# Patient Record
Sex: MALE | Race: WHITE | URBAN - METROPOLITAN AREA
[De-identification: names, ages, dates, MRNs, and addresses within clinical notes are randomized per-mention and may not be internally consistent; named-entity substitution may affect disease eponyms.]

---

## 2019-01-05 ENCOUNTER — OFFICE VISIT (OUTPATIENT)
Dept: URGENT CARE | Facility: URGENT CARE | Age: 59
End: 2019-01-05
Payer: COMMERCIAL

## 2019-01-05 VITALS
SYSTOLIC BLOOD PRESSURE: 146 MMHG | OXYGEN SATURATION: 99 % | WEIGHT: 201 LBS | HEART RATE: 91 BPM | DIASTOLIC BLOOD PRESSURE: 90 MMHG | TEMPERATURE: 98.2 F

## 2019-01-05 DIAGNOSIS — J02.9 SORE THROAT: Primary | ICD-10-CM

## 2019-01-05 DIAGNOSIS — M10.00 IDIOPATHIC GOUT, UNSPECIFIED CHRONICITY, UNSPECIFIED SITE: ICD-10-CM

## 2019-01-05 LAB
DEPRECATED S PYO AG THROAT QL EIA: NORMAL
SPECIMEN SOURCE: NORMAL

## 2019-01-05 PROCEDURE — 99203 OFFICE O/P NEW LOW 30 MIN: CPT | Performed by: FAMILY MEDICINE

## 2019-01-05 PROCEDURE — 87081 CULTURE SCREEN ONLY: CPT | Performed by: INTERNAL MEDICINE

## 2019-01-05 PROCEDURE — 87880 STREP A ASSAY W/OPTIC: CPT | Performed by: INTERNAL MEDICINE

## 2019-01-05 RX ORDER — INDOMETHACIN 50 MG/1
50 CAPSULE ORAL
Qty: 30 CAPSULE | Refills: 1 | Status: SHIPPED | OUTPATIENT
Start: 2019-01-05 | End: 2019-01-08

## 2019-01-05 RX ORDER — IBUPROFEN 200 MG
200 TABLET ORAL EVERY 4 HOURS PRN
COMMUNITY

## 2019-01-05 NOTE — PROGRESS NOTES
Subjective: Patient has a history of joint problems for many years, has never seen a doctor about it, has not seen a doctor in over 20 years, but he reports joints and spots on his body that swell up and are painful for a while and then go away.  He developed some right foot swelling about 4 days ago and it is painful, got a respiratory infection about a week ago and normally he says he never gets colds, sore throat with it, slowly getting better.  Then last night his left elbow area just distal to the joint became extremely swollen and is already going down quite a bit.  In some ways these are familiar symptoms to him.    Objective: ENT is normal.  Neck is normal.  Lungs are clear.  Heart is regular without murmurs.  Abdomen benign.  She has a slightly tender slightly red lateral foot on the right, slightly warm.  She has a swollen spot on his left elbow distal to the joint, about 3 cm in diameter, not particularly tender.    Assessment and plan: The respiratory infection is just a cold but I wonder if gout is the underlying problem with his unusual sets of symptoms.  His father had gout.  I suggested he follow-up with her regular doctor, needs to find one, and do a trial of indomethacin 50 mg 3 times a day for couple of days until things calm down.  This could theoretically be used in the future as well.

## 2019-01-06 LAB
BACTERIA SPEC CULT: NORMAL
SPECIMEN SOURCE: NORMAL

## 2024-12-21 ENCOUNTER — APPOINTMENT (OUTPATIENT)
Dept: MRI IMAGING | Facility: CLINIC | Age: 64
End: 2024-12-21
Attending: EMERGENCY MEDICINE
Payer: COMMERCIAL

## 2024-12-21 ENCOUNTER — HOSPITAL ENCOUNTER (OUTPATIENT)
Facility: CLINIC | Age: 64
Setting detail: OBSERVATION
Discharge: HOME | End: 2024-12-23
Attending: EMERGENCY MEDICINE | Admitting: EMERGENCY MEDICINE
Payer: COMMERCIAL

## 2024-12-21 DIAGNOSIS — I65.02 VERTEBRAL ARTERY OCCLUSION, LEFT: Primary | ICD-10-CM

## 2024-12-21 DIAGNOSIS — I10 HYPERTENSION, UNSPECIFIED TYPE: ICD-10-CM

## 2024-12-21 DIAGNOSIS — R20.2 FACIAL PARESTHESIA: ICD-10-CM

## 2024-12-21 DIAGNOSIS — E11.9 DIABETES MELLITUS, NEW ONSET (H): ICD-10-CM

## 2024-12-21 LAB
ANION GAP SERPL CALCULATED.3IONS-SCNC: 14 MMOL/L (ref 7–15)
ATRIAL RATE - MUSE: 91 BPM
B-OH-BUTYR SERPL-SCNC: 0.21 MMOL/L
BASE EXCESS BLDV CALC-SCNC: 1 MMOL/L (ref -3–3)
BASOPHILS # BLD AUTO: 0 10E3/UL (ref 0–0.2)
BASOPHILS NFR BLD AUTO: 0 %
BUN SERPL-MCNC: 19.8 MG/DL (ref 8–23)
CALCIUM SERPL-MCNC: 9 MG/DL (ref 8.8–10.4)
CHLORIDE SERPL-SCNC: 91 MMOL/L (ref 98–107)
CREAT SERPL-MCNC: 1.01 MG/DL (ref 0.67–1.17)
DIASTOLIC BLOOD PRESSURE - MUSE: NORMAL MMHG
EGFRCR SERPLBLD CKD-EPI 2021: 83 ML/MIN/1.73M2
EOSINOPHIL # BLD AUTO: 0.1 10E3/UL (ref 0–0.7)
EOSINOPHIL NFR BLD AUTO: 1 %
ERYTHROCYTE [DISTWIDTH] IN BLOOD BY AUTOMATED COUNT: 11.9 % (ref 10–15)
EST. AVERAGE GLUCOSE BLD GHB EST-MCNC: 280 MG/DL
FLUAV RNA SPEC QL NAA+PROBE: NEGATIVE
FLUBV RNA RESP QL NAA+PROBE: NEGATIVE
GLUCOSE BLDC GLUCOMTR-MCNC: 292 MG/DL (ref 70–99)
GLUCOSE SERPL-MCNC: 472 MG/DL (ref 70–99)
HBA1C MFR BLD: 11.4 %
HCO3 BLDV-SCNC: 26 MMOL/L (ref 21–28)
HCO3 SERPL-SCNC: 26 MMOL/L (ref 22–29)
HCT VFR BLD AUTO: 46.5 % (ref 40–53)
HGB BLD-MCNC: 16.8 G/DL (ref 13.3–17.7)
HOLD SPECIMEN: NORMAL
IMM GRANULOCYTES # BLD: 0 10E3/UL
IMM GRANULOCYTES NFR BLD: 0 %
INTERPRETATION ECG - MUSE: NORMAL
LYMPHOCYTES # BLD AUTO: 3.2 10E3/UL (ref 0.8–5.3)
LYMPHOCYTES NFR BLD AUTO: 39 %
MCH RBC QN AUTO: 31.2 PG (ref 26.5–33)
MCHC RBC AUTO-ENTMCNC: 36.1 G/DL (ref 31.5–36.5)
MCV RBC AUTO: 86 FL (ref 78–100)
MONOCYTES # BLD AUTO: 0.7 10E3/UL (ref 0–1.3)
MONOCYTES NFR BLD AUTO: 8 %
NEUTROPHILS # BLD AUTO: 4.3 10E3/UL (ref 1.6–8.3)
NEUTROPHILS NFR BLD AUTO: 51 %
NRBC # BLD AUTO: 0 10E3/UL
NRBC BLD AUTO-RTO: 0 /100
O2/TOTAL GAS SETTING VFR VENT: 0 %
OXYHGB MFR BLDV: 80 % (ref 70–75)
P AXIS - MUSE: 74 DEGREES
PCO2 BLDV: 43 MM HG (ref 40–50)
PH BLDV: 7.39 [PH] (ref 7.32–7.43)
PLATELET # BLD AUTO: 256 10E3/UL (ref 150–450)
PO2 BLDV: 44 MM HG (ref 25–47)
POTASSIUM SERPL-SCNC: 4.7 MMOL/L (ref 3.4–5.3)
PR INTERVAL - MUSE: 130 MS
QRS DURATION - MUSE: 92 MS
QT - MUSE: 350 MS
QTC - MUSE: 430 MS
R AXIS - MUSE: 50 DEGREES
RBC # BLD AUTO: 5.39 10E6/UL (ref 4.4–5.9)
RSV RNA SPEC NAA+PROBE: NEGATIVE
S PYO DNA THROAT QL NAA+PROBE: NOT DETECTED
SAO2 % BLDV: 81.4 % (ref 70–75)
SARS-COV-2 RNA RESP QL NAA+PROBE: NEGATIVE
SODIUM SERPL-SCNC: 131 MMOL/L (ref 135–145)
SYSTOLIC BLOOD PRESSURE - MUSE: NORMAL MMHG
T AXIS - MUSE: 28 DEGREES
TROPONIN T SERPL HS-MCNC: 17 NG/L
VENTRICULAR RATE- MUSE: 91 BPM
WBC # BLD AUTO: 8.3 10E3/UL (ref 4–11)

## 2024-12-21 PROCEDURE — 80048 BASIC METABOLIC PNL TOTAL CA: CPT | Performed by: EMERGENCY MEDICINE

## 2024-12-21 PROCEDURE — A9585 GADOBUTROL INJECTION: HCPCS | Performed by: EMERGENCY MEDICINE

## 2024-12-21 PROCEDURE — 82805 BLOOD GASES W/O2 SATURATION: CPT | Performed by: EMERGENCY MEDICINE

## 2024-12-21 PROCEDURE — 87637 SARSCOV2&INF A&B&RSV AMP PRB: CPT | Performed by: EMERGENCY MEDICINE

## 2024-12-21 PROCEDURE — 70549 MR ANGIOGRAPH NECK W/O&W/DYE: CPT

## 2024-12-21 PROCEDURE — 70553 MRI BRAIN STEM W/O & W/DYE: CPT

## 2024-12-21 PROCEDURE — 258N000003 HC RX IP 258 OP 636: Performed by: INTERNAL MEDICINE

## 2024-12-21 PROCEDURE — 87651 STREP A DNA AMP PROBE: CPT | Performed by: EMERGENCY MEDICINE

## 2024-12-21 PROCEDURE — 84484 ASSAY OF TROPONIN QUANT: CPT | Performed by: EMERGENCY MEDICINE

## 2024-12-21 PROCEDURE — 83036 HEMOGLOBIN GLYCOSYLATED A1C: CPT | Performed by: EMERGENCY MEDICINE

## 2024-12-21 PROCEDURE — 36415 COLL VENOUS BLD VENIPUNCTURE: CPT | Performed by: EMERGENCY MEDICINE

## 2024-12-21 PROCEDURE — 99223 1ST HOSP IP/OBS HIGH 75: CPT | Performed by: INTERNAL MEDICINE

## 2024-12-21 PROCEDURE — 82962 GLUCOSE BLOOD TEST: CPT

## 2024-12-21 PROCEDURE — 85025 COMPLETE CBC W/AUTO DIFF WBC: CPT | Performed by: EMERGENCY MEDICINE

## 2024-12-21 PROCEDURE — 258N000003 HC RX IP 258 OP 636: Performed by: EMERGENCY MEDICINE

## 2024-12-21 PROCEDURE — 70544 MR ANGIOGRAPHY HEAD W/O DYE: CPT

## 2024-12-21 PROCEDURE — 250N000012 HC RX MED GY IP 250 OP 636 PS 637: Performed by: INTERNAL MEDICINE

## 2024-12-21 PROCEDURE — 96360 HYDRATION IV INFUSION INIT: CPT | Mod: 59

## 2024-12-21 PROCEDURE — 255N000002 HC RX 255 OP 636: Performed by: EMERGENCY MEDICINE

## 2024-12-21 PROCEDURE — G0378 HOSPITAL OBSERVATION PER HR: HCPCS

## 2024-12-21 PROCEDURE — 80061 LIPID PANEL: CPT | Performed by: INTERNAL MEDICINE

## 2024-12-21 PROCEDURE — 250N000013 HC RX MED GY IP 250 OP 250 PS 637: Performed by: INTERNAL MEDICINE

## 2024-12-21 PROCEDURE — 93005 ELECTROCARDIOGRAM TRACING: CPT

## 2024-12-21 PROCEDURE — 83721 ASSAY OF BLOOD LIPOPROTEIN: CPT | Performed by: PHYSICIAN ASSISTANT

## 2024-12-21 PROCEDURE — 82248 BILIRUBIN DIRECT: CPT | Performed by: INTERNAL MEDICINE

## 2024-12-21 PROCEDURE — 82010 KETONE BODYS QUAN: CPT | Performed by: EMERGENCY MEDICINE

## 2024-12-21 PROCEDURE — 99285 EMERGENCY DEPT VISIT HI MDM: CPT | Mod: 25

## 2024-12-21 RX ORDER — NICOTINE POLACRILEX 4 MG
15-30 LOZENGE BUCCAL
Status: DISCONTINUED | OUTPATIENT
Start: 2024-12-21 | End: 2024-12-23 | Stop reason: HOSPADM

## 2024-12-21 RX ORDER — LISINOPRIL 10 MG/1
10 TABLET ORAL DAILY
Qty: 30 TABLET | Refills: 0 | Status: SHIPPED | OUTPATIENT
Start: 2024-12-21 | End: 2024-12-23

## 2024-12-21 RX ORDER — DEXTROSE MONOHYDRATE 25 G/50ML
25-50 INJECTION, SOLUTION INTRAVENOUS
Status: DISCONTINUED | OUTPATIENT
Start: 2024-12-21 | End: 2024-12-23 | Stop reason: HOSPADM

## 2024-12-21 RX ORDER — PROCHLORPERAZINE MALEATE 5 MG/1
5 TABLET ORAL EVERY 6 HOURS PRN
Status: DISCONTINUED | OUTPATIENT
Start: 2024-12-21 | End: 2024-12-23 | Stop reason: HOSPADM

## 2024-12-21 RX ORDER — LABETALOL HYDROCHLORIDE 5 MG/ML
10-40 INJECTION, SOLUTION INTRAVENOUS EVERY 10 MIN PRN
Status: DISCONTINUED | OUTPATIENT
Start: 2024-12-21 | End: 2024-12-23 | Stop reason: HOSPADM

## 2024-12-21 RX ORDER — ACETAMINOPHEN 650 MG/1
650 SUPPOSITORY RECTAL EVERY 4 HOURS PRN
Status: DISCONTINUED | OUTPATIENT
Start: 2024-12-21 | End: 2024-12-23 | Stop reason: HOSPADM

## 2024-12-21 RX ORDER — GLIPIZIDE 5 MG/1
5 TABLET ORAL
Qty: 30 TABLET | Refills: 0 | Status: SHIPPED | OUTPATIENT
Start: 2024-12-21 | End: 2024-12-23

## 2024-12-21 RX ORDER — ACETAMINOPHEN 325 MG/1
650 TABLET ORAL EVERY 4 HOURS PRN
Status: DISCONTINUED | OUTPATIENT
Start: 2024-12-21 | End: 2024-12-23 | Stop reason: HOSPADM

## 2024-12-21 RX ORDER — ASPIRIN 81 MG/1
81 TABLET ORAL DAILY
Status: DISCONTINUED | OUTPATIENT
Start: 2024-12-22 | End: 2024-12-23 | Stop reason: HOSPADM

## 2024-12-21 RX ORDER — ACETAMINOPHEN 325 MG/10.15ML
650 LIQUID ORAL EVERY 4 HOURS PRN
Status: DISCONTINUED | OUTPATIENT
Start: 2024-12-21 | End: 2024-12-23 | Stop reason: HOSPADM

## 2024-12-21 RX ORDER — HYDRALAZINE HYDROCHLORIDE 20 MG/ML
10-20 INJECTION INTRAMUSCULAR; INTRAVENOUS
Status: DISCONTINUED | OUTPATIENT
Start: 2024-12-21 | End: 2024-12-23 | Stop reason: HOSPADM

## 2024-12-21 RX ORDER — GADOBUTROL 604.72 MG/ML
10 INJECTION INTRAVENOUS ONCE
Status: COMPLETED | OUTPATIENT
Start: 2024-12-21 | End: 2024-12-21

## 2024-12-21 RX ORDER — ONDANSETRON 4 MG/1
4 TABLET, ORALLY DISINTEGRATING ORAL EVERY 6 HOURS PRN
Status: DISCONTINUED | OUTPATIENT
Start: 2024-12-21 | End: 2024-12-23 | Stop reason: HOSPADM

## 2024-12-21 RX ORDER — ONDANSETRON 2 MG/ML
4 INJECTION INTRAMUSCULAR; INTRAVENOUS EVERY 6 HOURS PRN
Status: DISCONTINUED | OUTPATIENT
Start: 2024-12-21 | End: 2024-12-23 | Stop reason: HOSPADM

## 2024-12-21 RX ORDER — PROCHLORPERAZINE 25 MG
12.5 SUPPOSITORY, RECTAL RECTAL EVERY 12 HOURS PRN
Status: DISCONTINUED | OUTPATIENT
Start: 2024-12-21 | End: 2024-12-23 | Stop reason: HOSPADM

## 2024-12-21 RX ADMIN — SODIUM CHLORIDE, POTASSIUM CHLORIDE, SODIUM LACTATE AND CALCIUM CHLORIDE 2000 ML: 600; 310; 30; 20 INJECTION, SOLUTION INTRAVENOUS at 23:34

## 2024-12-21 RX ADMIN — SODIUM CHLORIDE 1000 ML: 9 INJECTION, SOLUTION INTRAVENOUS at 18:01

## 2024-12-21 RX ADMIN — INSULIN GLARGINE 15 UNITS: 100 INJECTION, SOLUTION SUBCUTANEOUS at 23:49

## 2024-12-21 RX ADMIN — GADOBUTROL 10 ML: 604.72 INJECTION INTRAVENOUS at 19:42

## 2024-12-21 RX ADMIN — METFORMIN HYDROCHLORIDE 500 MG: 500 TABLET ORAL at 23:42

## 2024-12-21 ASSESSMENT — COLUMBIA-SUICIDE SEVERITY RATING SCALE - C-SSRS
6. HAVE YOU EVER DONE ANYTHING, STARTED TO DO ANYTHING, OR PREPARED TO DO ANYTHING TO END YOUR LIFE?: NO
1. IN THE PAST MONTH, HAVE YOU WISHED YOU WERE DEAD OR WISHED YOU COULD GO TO SLEEP AND NOT WAKE UP?: NO
2. HAVE YOU ACTUALLY HAD ANY THOUGHTS OF KILLING YOURSELF IN THE PAST MONTH?: NO

## 2024-12-21 ASSESSMENT — ACTIVITIES OF DAILY LIVING (ADL)
ADLS_ACUITY_SCORE: 41

## 2024-12-21 NOTE — ED TRIAGE NOTES
"Pt states that he has been having \"scalp pain\" on the left side, fatigue and itching for a few months. More recently, past few days, having L sided anterior neck pain and increased fatigue.      Triage Assessment (Adult)       Row Name 12/21/24 3724          Respiratory WDL    Respiratory WDL WDL        Cardiac WDL    Cardiac WDL WDL        Cognitive/Neuro/Behavioral WDL    Cognitive/Neuro/Behavioral WDL WDL                     "

## 2024-12-21 NOTE — ED PROVIDER NOTES
Emergency Department Note      History of Present Illness     Chief Complaint   Dizziness and Neck Pain      HPI   Roger Serrano is a 64 year old male with no notable health history presents with a variety of symptoms.  He notes he has been having tingling to the left face and scalp for couple of months that comes and goes predominantly on the left side that comes and goes also noting his vision has been off for couple of weeks and overall he feels off.  He notes he been feeling fatigued off balance and lightheaded.  He also notes now for few days been having neck pain right at the angle of his jaw.  Denies fevers or chills.  Denies headache or posterior neck pain.  Denies weight gain or weight loss.  He does note he drinks lots of water and works night shift at [employer].  She has been feeling off and not normal with the above symptoms.  With the added neck pain today and the underlying symptoms that has been having prompted him to come in as he was not feeling well today.    Independent Historian   None    Review of External Notes   Clinic notes    Past Medical History     Medical History and Problem List   Past Medical History:   Diagnosis Date    Diabetes mellitus, new onset (H) 12/21/2024    Hypertension, unspecified type 12/21/2024       Medications   blood glucose monitoring (NO BRAND SPECIFIED) meter device kit  glipiZIDE (GLUCOTROL) 5 MG tablet  lisinopril (ZESTRIL) 10 MG tablet        Surgical History   Past Surgical History:   Procedure Laterality Date    HERNIA REPAIR      ORTHOPEDIC SURGERY         Physical Exam     Patient Vitals for the past 24 hrs:   BP Temp Pulse Resp SpO2 Height Weight   12/22/24 0041 -- -- -- -- -- (P) 1.829 m (6') (P) 85.6 kg (188 lb 11.4 oz)   12/21/24 2100 (!) 157/97 -- 78 -- 96 % -- --   12/21/24 2045 (!) 154/96 -- 77 -- 96 % -- --   12/21/24 2030 (!) 157/96 -- 77 -- 98 % -- --   12/21/24 2015 (!) 164/99 -- -- -- -- -- --   12/21/24 1550 (!) 179/99 97.4  F (36.3  C) 92 18 98 % --  --     Physical Exam  GENERAL: well developed, pleasant  HEAD: atraumatic  EYES: pupils reactive, extraocular muscles intact, conjunctivae normal  ENT:  mucus membranes moist  NECK:  trachea midline, normal range of motion  RESPIRATORY: no tachypnea, breath sounds clear to auscultation   CVS: normal S1/S2, no murmurs, intact distal pulses  ABDOMEN: soft, nontender, nondistention  MUSCULOSKELETAL: no deformities  SKIN: warm and dry, no acute rashes or ulceration  NEURO: GCS 15, cranial nerves intact, alert and oriented x3, normal finger-to-nose normal gait  PSYCH:  Mood/affect normal      Diagnostics     Lab Results   Labs Ordered and Resulted from Time of ED Arrival to Time of ED Departure   BASIC METABOLIC PANEL - Abnormal       Result Value    Sodium 131 (*)     Potassium 4.7      Chloride 91 (*)     Carbon Dioxide (CO2) 26      Anion Gap 14      Urea Nitrogen 19.8      Creatinine 1.01      GFR Estimate 83      Calcium 9.0      Glucose 472 (*)    BLOOD GAS VENOUS - Abnormal    pH Venous 7.39      pCO2 Venous 43      pO2 Venous 44      Bicarbonate Venous 26      Base Excess/Deficit Venous 1.0      FIO2 0      Oxyhemoglobin Venous 80 (*)     O2 Sat, Venous 81.4 (*)    HEMOGLOBIN A1C - Abnormal    Estimated Average Glucose 280 (*)     Hemoglobin A1C 11.4 (*)    TROPONIN T, HIGH SENSITIVITY - Normal    Troponin T, High Sensitivity 17     KETONE BETA-HYDROXYBUTYRATE QUANTITATIVE, RAPID - Normal    Ketone (Beta-Hydroxybutyrate) Quantitative 0.21     INFLUENZA A/B, RSV AND SARS-COV2 PCR - Normal    Influenza A PCR Negative      Influenza B PCR Negative      RSV PCR Negative      SARS CoV2 PCR Negative     GROUP A STREPTOCOCCUS PCR THROAT SWAB - Normal    Group A strep by PCR Not Detected     CBC WITH PLATELETS AND DIFFERENTIAL    WBC Count 8.3      RBC Count 5.39      Hemoglobin 16.8      Hematocrit 46.5      MCV 86      MCH 31.2      MCHC 36.1      RDW 11.9      Platelet Count 256      % Neutrophils 51      %  Lymphocytes 39      % Monocytes 8      % Eosinophils 1      % Basophils 0      % Immature Granulocytes 0      NRBCs per 100 WBC 0      Absolute Neutrophils 4.3      Absolute Lymphocytes 3.2      Absolute Monocytes 0.7      Absolute Eosinophils 0.1      Absolute Basophils 0.0      Absolute Immature Granulocytes 0.0      Absolute NRBCs 0.0     UA MACROSCOPIC WITH REFLEX TO MICRO AND CULTURE   LIPID PROFILE       Imaging   MR Brain w/o & w Contrast   Final Result   IMPRESSION:   MRI BRAIN:   1.  No evidence of acute intracranial hemorrhage, mass effect, or infarction.      MRA HEAD:   1.  No evidence of pathologic vascular occlusion or saccular aneurysm within the visualized intracranial circulation by MR angiography.      MRA NECK:   1.  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   2.  No evidence of hemodynamically significant stenosis within either internal carotid artery within the neck.      MRA Angiogram Head w/o Contrast   Final Result   IMPRESSION:   MRI BRAIN:   1.  No evidence of acute intracranial hemorrhage, mass effect, or infarction.      MRA HEAD:   1.  No evidence of pathologic vascular occlusion or saccular aneurysm within the visualized intracranial circulation by MR angiography.      MRA NECK:   1.  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   2.  No evidence of hemodynamically significant stenosis within either internal carotid artery within the neck.      MRA Angiogram Neck w/o & w Contrast   Final Result   IMPRESSION:   MRI BRAIN:   1.  No evidence of acute intracranial hemorrhage, mass effect, or infarction.      MRA HEAD:   1.  No evidence of pathologic vascular occlusion or saccular aneurysm within the visualized intracranial circulation by MR angiography.      MRA  NECK:   1.  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   2.  No evidence of hemodynamically significant stenosis within either internal carotid artery within the neck.      Echocardiogram Complete    (Results Pending)       EKG   ECG results from 12/21/24   EKG 12-lead, tracing only     Value    Systolic Blood Pressure     Diastolic Blood Pressure     Ventricular Rate 91    Atrial Rate 91    MN Interval 130    QRS Duration 92        QTc 430    P Axis 74    R AXIS 50    T Axis 28    Interpretation ECG      Sinus rhythm  Normal ECG  No previous ECGs available  Confirmed by GENERATED REPORT, COMPUTER ([id number]),  Gary Mazariegos ([id number]) on 12/21/2024 3:57:37 PM           Independent Interpretation   None    ED Course      Medications Administered   Medications   glucose gel 15-30 g (has no administration in time range)     Or   dextrose 50 % injection 25-50 mL (has no administration in time range)     Or   glucagon injection 1 mg (has no administration in time range)   labetalol (NORMODYNE/TRANDATE) injection 10-40 mg (has no administration in time range)   hydrALAZINE (APRESOLINE) injection 10-20 mg (has no administration in time range)   acetaminophen (TYLENOL) tablet 650 mg (has no administration in time range)     Or   acetaminophen (TYLENOL) oral liquid 650 mg (has no administration in time range)     Or   acetaminophen (TYLENOL) Suppository 650 mg (has no administration in time range)   ondansetron (ZOFRAN ODT) ODT tab 4 mg (has no administration in time range)     Or   ondansetron (ZOFRAN) injection 4 mg (has no administration in time range)   prochlorperazine (COMPAZINE) injection 5 mg (has no administration in time range)     Or   prochlorperazine (COMPAZINE) tablet 5 mg (has no administration in time range)     Or   prochlorperazine (COMPAZINE) suppository 12.5 mg (has no administration  in time range)   insulin glargine (LANTUS PEN) injection 15 Units (has no administration in time range)   insulin aspart (NovoLOG) injection (RAPID ACTING) (has no administration in time range)   insulin aspart (NovoLOG) injection (RAPID ACTING) (has no administration in time range)   insulin aspart (NovoLOG) injection (RAPID ACTING) (has no administration in time range)   lactated ringers BOLUS 2,000 mL (2,000 mLs Intravenous $New Bag 12/21/24 2334)   aspirin EC tablet 81 mg (has no administration in time range)   metFORMIN (GLUCOPHAGE) tablet 500 mg (500 mg Oral $Given 12/21/24 2342)   sodium chloride 0.9% BOLUS 1,000 mL (0 mLs Intravenous Stopped 12/21/24 1906)   gadobutrol (GADAVIST) injection 10 mL (10 mLs Intravenous $Given 12/21/24 1942)   sodium chloride (PF) 0.9% PF flush 100 mL (100 mLs Intravenous $Given 12/21/24 1942)   insulin glargine (LANTUS PEN) injection 15 Units (15 Units Subcutaneous $Given 12/21/24 2349)       Procedures   Procedures     Discussion of Management   Admitting Hospitalist, Dr. Ruiz  Stroke neurology    ED Course        Additional Documentation  None    Medical Decision Making / Diagnosis     CMS Diagnoses: None    MIPS       None    MDM   Roger Serrano is a 64 year old male presents with intermittent paresthesias to the left face and head feelings of fatigue feeling off and dizzy and now neck pain in the anterior neck on the left side for couple of days.  He is pointing more to a lymph node.  He has no symptoms in his arms or legs in terms of stroke.  Basic labs are obtained showing a blood sugar of 400 and he notes just having brownies before coming in but also had mentioned that he drinks lots of water.  Hemoglobin A1c is high looks to be undiagnosed diabetes.  Blood pressures also been elevated in the 170s it looks to be underlying hypertension as well.  MRI head and neck shows no evidence of stroke but does note occlusion of the left vertebral artery.  Spoke with stroke  neurology and given the constellation of symptoms we will bring him in for observation.  Patient will need diabetes education as well as beginning hypertensive treatment once cleared from stroke for discharge.    Disposition   The patient was admitted to the hospital.     Diagnosis     ICD-10-CM    1. Diabetes mellitus, new onset (H)  E11.9       2. Facial paresthesia  R20.2       3. Hypertension, unspecified type  I10            Discharge Medications   Current Discharge Medication List        START taking these medications    Details   blood glucose monitoring (NO BRAND SPECIFIED) meter device kit Use to test blood sugar 2 times daily or as directed.  Qty: 1 kit, Refills: 0      glipiZIDE (GLUCOTROL) 5 MG tablet Take 1 tablet (5 mg) by mouth daily (with breakfast).  Qty: 30 tablet, Refills: 0      lisinopril (ZESTRIL) 10 MG tablet Take 1 tablet (10 mg) by mouth daily.  Qty: 30 tablet, Refills: 0               MD Dago Zarate Shaun L, MD  12/22/24 0046

## 2024-12-22 ENCOUNTER — APPOINTMENT (OUTPATIENT)
Dept: CARDIOLOGY | Facility: CLINIC | Age: 64
End: 2024-12-22
Attending: INTERNAL MEDICINE
Payer: COMMERCIAL

## 2024-12-22 LAB
ALBUMIN SERPL BCG-MCNC: 4.3 G/DL (ref 3.5–5.2)
ALBUMIN UR-MCNC: NEGATIVE MG/DL
ALP SERPL-CCNC: 87 U/L (ref 40–150)
ALT SERPL W P-5'-P-CCNC: 30 U/L (ref 0–70)
APPEARANCE UR: CLEAR
AST SERPL W P-5'-P-CCNC: 27 U/L (ref 0–45)
BILIRUB DIRECT SERPL-MCNC: <0.2 MG/DL (ref 0–0.3)
BILIRUB SERPL-MCNC: 0.3 MG/DL
BILIRUB UR QL STRIP: NEGATIVE
CHOLEST SERPL-MCNC: 210 MG/DL
COLOR UR AUTO: YELLOW
GLUCOSE BLDC GLUCOMTR-MCNC: 253 MG/DL (ref 70–99)
GLUCOSE BLDC GLUCOMTR-MCNC: 272 MG/DL (ref 70–99)
GLUCOSE BLDC GLUCOMTR-MCNC: 280 MG/DL (ref 70–99)
GLUCOSE BLDC GLUCOMTR-MCNC: 289 MG/DL (ref 70–99)
GLUCOSE BLDC GLUCOMTR-MCNC: 314 MG/DL (ref 70–99)
GLUCOSE UR STRIP-MCNC: >=1000 MG/DL
HDLC SERPL-MCNC: 34 MG/DL
HGB UR QL STRIP: NEGATIVE
KETONES UR STRIP-MCNC: NEGATIVE MG/DL
LDLC SERPL CALC-MCNC: ABNORMAL MG/DL
LDLC SERPL DIRECT ASSAY-MCNC: 94 MG/DL
LEUKOCYTE ESTERASE UR QL STRIP: NEGATIVE
LVEF ECHO: NORMAL
NITRATE UR QL: NEGATIVE
NONHDLC SERPL-MCNC: 176 MG/DL
PH UR STRIP: 7 [PH] (ref 5–7)
PROT SERPL-MCNC: 7.1 G/DL (ref 6.4–8.3)
SP GR UR STRIP: 1.02 (ref 1–1.03)
TRIGL SERPL-MCNC: 761 MG/DL
UROBILINOGEN UR STRIP-MCNC: NORMAL MG/DL

## 2024-12-22 PROCEDURE — 99255 IP/OBS CONSLTJ NEW/EST HI 80: CPT | Mod: FS | Performed by: PHYSICIAN ASSISTANT

## 2024-12-22 PROCEDURE — 81003 URINALYSIS AUTO W/O SCOPE: CPT | Performed by: INTERNAL MEDICINE

## 2024-12-22 PROCEDURE — 999N000208 ECHOCARDIOGRAM COMPLETE

## 2024-12-22 PROCEDURE — 999N000111 HC STATISTIC OT IP EVAL DEFER

## 2024-12-22 PROCEDURE — 82962 GLUCOSE BLOOD TEST: CPT

## 2024-12-22 PROCEDURE — 250N000012 HC RX MED GY IP 250 OP 636 PS 637: Performed by: INTERNAL MEDICINE

## 2024-12-22 PROCEDURE — 99232 SBSQ HOSP IP/OBS MODERATE 35: CPT | Performed by: HOSPITALIST

## 2024-12-22 PROCEDURE — 255N000002 HC RX 255 OP 636: Performed by: INTERNAL MEDICINE

## 2024-12-22 PROCEDURE — 250N000013 HC RX MED GY IP 250 OP 250 PS 637: Performed by: HOSPITALIST

## 2024-12-22 PROCEDURE — 999N000147 HC STATISTIC PT IP EVAL DEFER

## 2024-12-22 PROCEDURE — 93306 TTE W/DOPPLER COMPLETE: CPT | Mod: 26 | Performed by: INTERNAL MEDICINE

## 2024-12-22 PROCEDURE — 999N000226 HC STATISTIC SLP IP EVAL DEFER: Performed by: REHABILITATION PRACTITIONER

## 2024-12-22 PROCEDURE — G0378 HOSPITAL OBSERVATION PER HR: HCPCS

## 2024-12-22 PROCEDURE — 250N000013 HC RX MED GY IP 250 OP 250 PS 637: Performed by: INTERNAL MEDICINE

## 2024-12-22 RX ORDER — LISINOPRIL 10 MG/1
10 TABLET ORAL DAILY
Status: DISCONTINUED | OUTPATIENT
Start: 2024-12-22 | End: 2024-12-23 | Stop reason: HOSPADM

## 2024-12-22 RX ORDER — LANCETS
EACH MISCELLANEOUS
Qty: 50 EACH | Refills: 6 | Status: SHIPPED | OUTPATIENT
Start: 2024-12-22

## 2024-12-22 RX ORDER — ATORVASTATIN CALCIUM 20 MG/1
20 TABLET, FILM COATED ORAL EVERY EVENING
Status: DISCONTINUED | OUTPATIENT
Start: 2024-12-22 | End: 2024-12-23 | Stop reason: HOSPADM

## 2024-12-22 RX ADMIN — ASPIRIN 81 MG: 81 TABLET, COATED ORAL at 10:35

## 2024-12-22 RX ADMIN — INSULIN ASPART 2 UNITS: 100 INJECTION, SOLUTION INTRAVENOUS; SUBCUTANEOUS at 22:22

## 2024-12-22 RX ADMIN — METFORMIN HYDROCHLORIDE 500 MG: 500 TABLET ORAL at 16:47

## 2024-12-22 RX ADMIN — LISINOPRIL 10 MG: 10 TABLET ORAL at 14:24

## 2024-12-22 RX ADMIN — PERFLUTREN 10 ML: 6.52 INJECTION, SUSPENSION INTRAVENOUS at 12:12

## 2024-12-22 RX ADMIN — ATORVASTATIN CALCIUM 20 MG: 20 TABLET, FILM COATED ORAL at 20:37

## 2024-12-22 RX ADMIN — INSULIN ASPART 2 UNITS: 100 INJECTION, SOLUTION INTRAVENOUS; SUBCUTANEOUS at 01:10

## 2024-12-22 ASSESSMENT — ACTIVITIES OF DAILY LIVING (ADL)
ADLS_ACUITY_SCORE: 25
ADLS_ACUITY_SCORE: 20
ADLS_ACUITY_SCORE: 25
ADLS_ACUITY_SCORE: 20
ADLS_ACUITY_SCORE: 25

## 2024-12-22 NOTE — DISCHARGE INSTRUCTIONS
Your risk factors for stroke or TIA (transient ischemic attack):     Your Risk Factors Your Results Goals   [] High blood pressure BP: (!) 148/89 (12/23/24 1122) Less than 120/80   [] Cholesterol          Total 12/21/2024: 210 mg/dL   Less than 150    Triglycerides   12/21/2024: 761 mg/dL Less than 150    LDL 12/21/2024: 94 mg/dL    Less than 70    HDL 12/21/2024: 34 mg/dL         Greater than 40 (men)  Greater than 50 (women)   [] Diabetes                A1C 12/21/2024: 11.4 % Less than 5.7   [] Atrial fibrillation Atrial fibrillation noted on cardiac monitoring Manage per physician orders   [] Smoking/tobacco use   Tobacco Use      Smoking status: Never      Smokeless tobacco: Never   Quit smoking and tobacco   [] Overweight Body mass index is 25.59 kg/m .  Less than 25     Other risk factors include: carotid (neck) artery disease, other heart diseases, prior stroke or TIA, poor diet, lack of exercise, and excessive alcohol consumption.     [] Written stroke educational materials given to patient including:   - Learning about BE FAST: Stroke Warning Signs and Learning about Risk Factors for Stroke (Healthwise)   - Understanding Stroke: Key Resources After a Stroke (FOD #713474)       Know the warning signs and symptoms of stroke: BE FAST     B = Balance loss   E = Eyesight changes   F = Facial droop or numbness   A = Arm or leg weakness   S = Speech difficulty, slurred speech   T = Time to call 911 for help

## 2024-12-22 NOTE — CONSULTS
[facility]    Stroke Consult Note    Reason for Consult:  vertebral artery occlusion    Chief Complaint: Dizziness and Neck Pain       HPI  Roger Serrano is a 64 year old male with PMH untreated HTN, new diagnosis of DM2 this admission.  Presented to the ED 12/21 with several symptoms including intermittent left facial and scalp paresthesias for the past couple of months.  Over the same timeframe he has noted some intermittent soreness of the anterior left neck, thought there might be some lymph node enlargement in that area.  He has also been noticing polyuria, polydipsia, fatigue and positional lightheadedness.  He denies focal weakness, speech changes, diplopia.  He does report some intermittent blurred vision of both eyes.  He denies any vertigo or balance difficulty.  MRI was obtained and showed no evidence of stroke.  MRA showed left vert occlusion at the origin with distal reconstitution of the V2 segment.    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  No evidence of stroke associated with this finding and history not suggestive of stroke/TIA.  Unclear etiology of intermittent left facial paresthesia -consider outpatient general neurology follow-up    Recommendations   -Gradual normalization toward long-term outpatient goal BP <130/80 with tighter control associated with decreased overall CV risk, if tolerated.  -Aspirin 81 mg daily for primary stroke prevention  -Telemetry monitoring  -Blood glucose monitoring  -Hemoglobin A1c goal <7%,  "management per primary  -TTE not needed from a stroke perspective  -LDL is 94, significant hypertriglyceridemia and low HDL.  Given evidence of left vertebral artery occlusion which is likely atherosclerotic in etiology would recommend starting atorvastatin.  Will defer further management of hyperlipidemia to the primary team.      Patient Follow-up    - in the next 1-2 week(s) with PCP    Thank you for this consult. No further stroke evaluation is recommended, so we will sign off. Please contact us with any additional questions.    Jaky Hurley PA-C  Vascular Neurology    To page me or covering stroke neurology team member, click here: [facility]  Choose \"On Call\" tab at top, then select \"NEUROLOGY/ALL SITES\" from middle drop-down box, press Enter, then look for \"stroke\" or \"telestroke\" for your site.  _____________________________________________________    Clinically Significant Risk Factors Present on Admission         # Hyponatremia: Lowest Na = 131 mmol/L in last 2 days, will monitor as appropriate  # Hypochloremia: Lowest Cl = 91 mmol/L in last 2 days, will monitor as appropriate          # Hypertension: Noted on problem list          # DMII: A1C = 11.4 % (Ref range: <5.7 %) within past 6 months    # Overweight: Estimated body mass index is 25.59 kg/m  as calculated from the following:    Height as of this encounter: 1.829 m (6').    Weight as of this encounter: 85.6 kg (188 lb 11.4 oz).              Past Medical History    Past Medical History:   Diagnosis Date    Diabetes mellitus, new onset (H) 12/21/2024    Hypertension, unspecified type 12/21/2024     Medications   Home Meds  Prior to Admission medications    Medication Sig Start Date End Date Taking? Authorizing Provider   blood glucose monitoring (NO BRAND SPECIFIED) meter device kit Use to test blood sugar 2 times daily or as directed. 12/21/24  Yes Antonio Loza MD   glipiZIDE (GLUCOTROL) 5 MG tablet Take 1 tablet (5 mg) by mouth daily (with breakfast). " 12/21/24  Yes Antonio Loza MD   lisinopril (ZESTRIL) 10 MG tablet Take 1 tablet (10 mg) by mouth daily. 12/21/24  Yes Antonio Loza MD       Scheduled Meds  Current Facility-Administered Medications   Medication Dose Route Frequency Provider Last Rate Last Admin    aspirin EC tablet 81 mg  81 mg Oral Daily Ruiz, Amauri Duran MD   81 mg at 12/22/24 1035    insulin aspart (NovoLOG) injection (RAPID ACTING)   Subcutaneous TID w/meals Ruiz, Amauri Duran MD   3 Units at 12/22/24 0846    insulin aspart (NovoLOG) injection (RAPID ACTING)  1-7 Units Subcutaneous TID AC Ruiz, Amauri Duran MD   3 Units at 12/22/24 0804    insulin aspart (NovoLOG) injection (RAPID ACTING)  1-5 Units Subcutaneous At Bedtime Ruiz, Amauri Duran MD   2 Units at 12/22/24 0110    insulin glargine (LANTUS PEN) injection 15 Units  15 Units Subcutaneous At Bedtime Ruiz, Amauri Duran MD        lisinopril (ZESTRIL) tablet 10 mg  10 mg Oral Daily Alfonso Obrien MD        metFORMIN (GLUCOPHAGE) tablet 500 mg  500 mg Oral Daily with supper Ruiz, Amauri Duran MD   500 mg at 12/21/24 2342       Infusion Meds  Current Facility-Administered Medications   Medication Dose Route Frequency Provider Last Rate Last Admin       Allergies   No Known Allergies       PHYSICAL EXAMINATION   Temp:  [97.4  F (36.3  C)-98.8  F (37.1  C)] 98.8  F (37.1  C)  Pulse:  [77-92] 80  Resp:  [18] 18  BP: (154-179)/() 164/97  SpO2:  [96 %-98 %] 96 %    General Exam  General:  patient lying in bed without any acute distress    HEENT:  normocephalic/atraumatic    Neuro Exam  Mental Status:  alert, oriented x 3, follows commands, speech clear and fluent, naming and repetition normal  Cranial Nerves:  visual fields intact, PERRL, EOMI with normal smooth pursuit, facial sensation intact and symmetric, facial movements symmetric, hearing not formally tested but intact to conversation, no dysarthria  Motor:  normal muscle tone and bulk, no abnormal movements, able to move  all limbs spontaneously, strength 5/5 throughout upper and lower extremities, no pronator drift  Sensory:  light touch sensation intact and symmetric throughout upper and lower extremities  Coordination:  normal finger-to-nose and heel-to-shin bilaterally without dysmetria, rapid alternating movements symmetric  Station/Gait:  deferred    Imaging  I personally reviewed all imaging; relevant findings per HPI.    Labs Data   CBC  Recent Labs   Lab 12/21/24  1556   WBC 8.3   RBC 5.39   HGB 16.8   HCT 46.5        Basic Metabolic Panel   Recent Labs   Lab 12/22/24  1117 12/22/24  0743 12/22/24  0021 12/21/24  2346 12/21/24  1556   NA  --   --   --   --  131*   POTASSIUM  --   --   --   --  4.7   CHLORIDE  --   --   --   --  91*   CO2  --   --   --   --  26   BUN  --   --   --   --  19.8   CR  --   --   --   --  1.01   * 253* 289*   < > 472*   MARIAM  --   --   --   --  9.0    < > = values in this interval not displayed.     Liver Panel  Recent Labs   Lab 12/21/24  1556   PROTTOTAL 7.1   ALBUMIN 4.3   BILITOTAL 0.3   ALKPHOS 87   AST 27   ALT 30     INR  No lab results found.        Stroke Consult Data Data   This was a non-emergent, non-telestroke consult.  I have personally spent a total of 45 minutes providing care today, time spent in reviewing medical records and devising the plan as recorded above.

## 2024-12-22 NOTE — PROGRESS NOTES
[facility]  Hospitalist Progress Note        Alfonso Obrien MD   12/22/2024        Interval History:        - No acute issues overnight; blood glucose improving from 472--->280s  - neurology following         Assessment and Plan:        Roger Serrano is a 64 year old male with limited prior medical follow-up admitted on 12/21/2024. He presented with complaints of on and off left-sided facial paresthesias for the past 2 months or so, a year or more of polyuria and polydipsia, fatigue, lightheadedness, and now 2 or so days of right anterior cervical chain tenderness.  He is found to have a diminutive left vertebral artery with occlusion but no finding of stroke as well as a new diagnosis of type 2 diabetes with a hemoglobin A1c of 11+.     Type 2 diabetes, uncontrolled, new diagnosis:   - HbA1c was 5.9 in 2019, now 11.4; symptomatic hyperglycemia; On and off blurred vision as well as lightheadedness may be related to his uncontrolled diabetes  - LDL 94; normal LFTs  - started on Lantus 15 units at bedtime, metformin 500 mg daily-- will continue; might need further titration of insulin and metformin on follow-up  - Aspirin 81 mg daily for secondary prevention  - UA with no proteinuria  - Will need diabetic educator follow-up as well as need to establish primary care for ongoing up titration of diabetes regimen  - will order diabetic supplies for discharge   - continue Prandial insulin at 1 unit per 20 g carbohydrate for now but do not plan to discharge on mealtime insulin to simplify regimen   - Medium dose sliding scale insulin as needed with hypoglycemia protocol     Hypertension:   - BP in 160s--180s; not on any antihypertensives PTA   - will start Lisinopril 10 mg daily  - hydralazine IV PRN for SBP>180     Left vertebral artery occlusion: l.  Left facial droop:   - Patient with paresthesias on and off of his left face and scalp for the past 2 months or so; on presentation was also noted with  slight left facial droop  -MRI noted with no stroke  -MRA neck noted Occlusion of the left vertebral artery at its origin with distal reconstitution of the V2 segment which is diminutive throughout the remainder of its course.  No evidence of hemodynamically significant stenosis within either internal carotid artery within the neck  - Stroke neurology following  - continue aspirin 81 mg daily  - LDL 94; will start low dose Lipitor 20 mg daily per neruo recs  - Neuro suggest no further stroke workup  - cleared by SLP; patient independent and no therapy needs     Pseudohyponatremia:   - Serum sodium of 131-- corrects to about 136 for BG in 400s     Left anterior cervical chain adenopathy: Patient with approximately 1-1.5 cm area of fullness which is slightly tender under angle of jaw in the vicinity of his anterior cervical chain versus submandibular gland (gland seems less likely as deeper in neck rather than under jaw).  Has noted discomfort here for the past 2 days.  No fevers or chills, no cough or sore throat.  Has not felt ill, but had some viral illness exposure with his family last week.  No comment on MRA read at this level of any concerning finding.  -Monitor for now given somewhat recent viral exposure.  At follow-up in approximately 1-2 week if this is persisting or worsening, can pursue ultrasound imaging with subsequent biopsy if indicated.  Discussed with patient as well as his girlfriend on admission.  If adenopathy resolves, likely could be attributed to viral illness  -Strep and COVID-negative    DVT Prophylaxis: Pneumatic Compression Devices    Code Status:  full code. Confirmed with patient on admission.    Diet: Moderate Consistent Carb (60 g CHO per Meal) Diet     Disposition:   Medically Ready for Discharge: Anticipated Tomorrow pending clinical stability , neurology clearance  - care plan discussed with patient and neurology    Care plan discussed with patient and nursing    Clinically  Significant Risk Factors Present on Admission         # Hyponatremia: Lowest Na = 131 mmol/L in last 2 days, will monitor as appropriate  # Hypochloremia: Lowest Cl = 91 mmol/L in last 2 days, will monitor as appropriate            # Hypertension: Noted on problem list            # DMII: A1C = 11.4 % (Ref range: <5.7 %) within past 6 months    # Overweight: Estimated body mass index is 25.59 kg/m  as calculated from the following:    Height as of this encounter: 1.829 m (6').    Weight as of this encounter: 85.6 kg (188 lb 11.4 oz).                               Physical Exam:      Blood pressure (!) 154/100, pulse 83, temperature 97.9  F (36.6  C), temperature source Oral, resp. rate 18, height 1.829 m (6'), weight 85.6 kg (188 lb 11.4 oz), SpO2 96%.  Vitals:    12/22/24 0041   Weight: 85.6 kg (188 lb 11.4 oz)     Vital Signs with Ranges  Temp:  [97.4  F (36.3  C)-97.9  F (36.6  C)] 97.9  F (36.6  C)  Pulse:  [77-92] 83  Resp:  [18] 18  BP: (154-179)/() 154/100  SpO2:  [96 %-98 %] 96 %  I/O's Last 24 hours  I/O last 3 completed shifts:  In: 1150 [P.O.:150; IV Piggyback:1000]  Out: -     Constitutional: Alert, awake and oriented X 3; resting comfortably in no apparent distress       Oral cavity: Moist mucosa   Cardiovascular: Normal s1 s2, regular rate and rhythm, no murmur   Lungs: B/l clear to auscultation, no wheezes or crepitations   Abdomen: Soft, nt, nd, no guarding, rigidity or rebound; BS +   LE : No edema   Musculoskeletal/Neuro Power 5/5 in all extremities; No focal neurological deficits noted   Psychiatry: normal mood and affect                Medications:        Current Facility-Administered Medications   Medication Dose Route Frequency Provider Last Rate Last Admin    aspirin EC tablet 81 mg  81 mg Oral Daily Ruiz, Amauri Duran MD        insulin aspart (NovoLOG) injection (RAPID ACTING)   Subcutaneous TID w/meals Ruiz, Amauri Duran MD        insulin aspart (NovoLOG) injection (RAPID ACTING)  1-7  Units Subcutaneous TID AC Ruiz, Amauri Duran MD        insulin aspart (NovoLOG) injection (RAPID ACTING)  1-5 Units Subcutaneous At Bedtime Ruiz, Amauri Duran MD   2 Units at 12/22/24 0110    insulin glargine (LANTUS PEN) injection 15 Units  15 Units Subcutaneous At Bedtime Ruiz, Amauri Duran MD        metFORMIN (GLUCOPHAGE) tablet 500 mg  500 mg Oral Daily with supper Ruiz, Amauri Duran MD   500 mg at 12/21/24 2342     PRN Meds:   Current Facility-Administered Medications   Medication Dose Route Frequency Provider Last Rate Last Admin    acetaminophen (TYLENOL) tablet 650 mg  650 mg Oral Q4H PRN Ruiz, Amauri Duran MD        Or    acetaminophen (TYLENOL) oral liquid 650 mg  650 mg Per NG tube Q4H PRN Ruiz, Amauri Duran MD        Or    acetaminophen (TYLENOL) Suppository 650 mg  650 mg Rectal Q4H PRN Ruiz, Amauri Duran MD        glucose gel 15-30 g  15-30 g Oral Q15 Min PRN Ruiz, Amauri Duran MD        Or    dextrose 50 % injection 25-50 mL  25-50 mL Intravenous Q15 Min PRN Ruiz, Amauri Duran MD        Or    glucagon injection 1 mg  1 mg Subcutaneous Q15 Min PRN Ruiz, Amauri Duran MD        hydrALAZINE (APRESOLINE) injection 10-20 mg  10-20 mg Intravenous Q1H PRN Ruiz, Amauri Duran MD        labetalol (NORMODYNE/TRANDATE) injection 10-40 mg  10-40 mg Intravenous Q10 Min PRN Ruiz, Amauri Duran MD        ondansetron (ZOFRAN ODT) ODT tab 4 mg  4 mg Oral Q6H PRN Ruiz, Amauri Duran MD        Or    ondansetron (ZOFRAN) injection 4 mg  4 mg Intravenous Q6H PRN Ruiz, Amauri Duran MD        prochlorperazine (COMPAZINE) injection 5 mg  5 mg Intravenous Q6H PRN Ruiz, Amauri Duran MD        Or    prochlorperazine (COMPAZINE) tablet 5 mg  5 mg Oral Q6H PRN Ruiz, Amauri Duran MD        Or    prochlorperazine (COMPAZINE) suppository 12.5 mg  12.5 mg Rectal Q12H PRN Ruiz, Amauri Duran MD                Data:      All new lab and imaging data was reviewed.   Recent Labs   Lab Test 12/21/24  1556   WBC 8.3   HGB 16.8   MCV 86   PLT  "256      Recent Labs   Lab Test 12/22/24  0021 12/21/24  2346 12/21/24  1556   NA  --   --  131*   POTASSIUM  --   --  4.7   CHLORIDE  --   --  91*   CO2  --   --  26   BUN  --   --  19.8   CR  --   --  1.01   ANIONGAP  --   --  14   MARIAM  --   --  9.0   * 292* 472*     No lab results found.    Invalid input(s): \"TROP\", \"TROPONINIES\"      "

## 2024-12-22 NOTE — PLAN OF CARE
Goal Outcome Evaluation:      Plan of Care Reviewed With: patient        Ruled out stroke/Left vertebral artery occlusion. Neuros - alert & oriented, following commands, no dizziness, no facial droop noted, no numbness or tingling. No cough or shortness of breath. VSS, RA Tele - sR. Orthostatic blood pressure checks performed/documented. Moderate carb diet obtained, lunch tray delayed (md made aware). Blood sugar monitoring; sliding scale insulin provided & carb counted. Up independently in room. Continent of bladder & bowels, bm today.  Denies pain. Pt scoring green on the Aggression Stop Light Tool. ECHO completed, results pending. Anticipate discharge home once medically stable. Will need blood glucose machine & monitoring supplies for discharge.

## 2024-12-22 NOTE — PLAN OF CARE
OT: Order received, chart reviewed and discussed with care team and patient. OT not indicated due to patient symptoms have been intermittent and have resolved at this time. IND in room, no concerns with mobility. Symptoms thought to be from diabetes. Defer discharge recommendations to care team, anticipate home. Will complete orders.

## 2024-12-22 NOTE — PROGRESS NOTES
Speech Language Pathology    Orders received and chart reviewed. Discussed case with RN. RN reports no concerns with swallow/speech/language. Regular diet. Will defer SLP evaluation, please re-order if concerns arise.

## 2024-12-22 NOTE — PROGRESS NOTES
RECEIVING UNIT ED HANDOFF REVIEW    ED Nurse Handoff Report was reviewed by: Roya Boone RN on December 21, 2024 at 11:37 PM

## 2024-12-22 NOTE — PLAN OF CARE
PT orders received, chart reviewed. Per discussion w/ OT and chart review, no skilled PT needs identified. Pt mobilizing IND. Will complete orders

## 2024-12-22 NOTE — ED NOTES
[facility]    Stroke Telephone Note    I was called by Antonio Loza on 12/21/24 regarding patient Roger Serrano. The patient is a 64 year old male presents w/ tingling of his left face and scalp for several months, for the past couple of weeks he was feeling fatigued, off balance and lightheaded. He also endorses left anterior neck pain for the past couple of days. I was paged about MRA findings of L vertebral artery occlusion. Found to have diabetes w/ A1C 11.4 and .     Vitals  BP: (!) 179/99   Pulse: 92   Resp: 18   Temp: 97.4  F (36.3  C)        Imaging Findings    MRI BRAIN:  1.  No evidence of acute intracranial hemorrhage, mass effect, or infarction.     MRA HEAD:  1.  No evidence of pathologic vascular occlusion or saccular aneurysm within the visualized intracranial circulation by MR angiography.     MRA NECK:  1.  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  2.  No evidence of hemodynamically significant stenosis within either internal carotid artery within the neck.    Impression  Fatigued, off balance and lightheaded.  Newly diagnosed DM2  L Vert occlusion possibly dissection vs atherosclerotic     Recommendations  - Place Neurology IP Stroke Consult order   - Neurochecks and Vital Signs every 4 hrs  - SBP goal <180  - Daily aspirin 81 mg for secondary stroke prevention  - Telemetry, EKG  - Bedside Glucose Monitoring  - A1c, Lipid Panel, Troponin x 3  - PT/OT/SLP  - Stroke Education  - Euthermia, Euglycemia  - TTE    My recommendations are based on the information provided over the phone by Roger Serrano's in-person providers. They are not intended to replace the clinical judgment of his in-person providers. I was not requested to personally see or examine the patient at this time.     The Stroke Staff is Dr. CHIN  .    Yuri Richter,  "MD  Vascular Neurology Fellow    To page me or covering stroke neurology team member, click here: [facility]  Choose \"On Call\" tab at top, then select \"NEUROLOGY/ALL SITES\" from middle drop-down box, press Enter, then look for \"stroke\" or \"telestroke\" for your site.   "

## 2024-12-22 NOTE — H&P
[facility]    History and Physical - Hospitalist Service       Date of Admission:  12/21/2024    Assessment & Plan      Roger Serrano is a 64 year old male with limited prior medical follow-up admitted on 12/21/2024. He presents with complaints of on and off left-sided facial paresthesias for the past 2 months or so, a year or more of polyuria and polydipsia, fatigue, lightheadedness, and now 2 or so days of right anterior cervical chain tenderness.  He is found to have a diminutive left vertebral artery with occlusion but no finding of stroke as well as a new diagnosis of type 2 diabetes with a hemoglobin A1c of 11+.    Type 2 diabetes, uncontrolled, new diagnosis: Hemoglobin A1c was 5.9 in 2019, now 11.4.  Polyuria and polydipsia for 1 to 2 years by his report where he is up peeing every 3 hours or so and drinking copious amounts of water.  On and off blurred vision as well as lightheadedness may be related to his uncontrolled diabetes.  Interrupted sleep may be contributing to his fatigue, but fatigue is more of a longstanding issue for him  -Lipid panel pending; anticipate initiation of statin therapy pending LDL result, but note LDL in the 50s range as of 2019 lipid panel through [facility] system. (Add on hepatic panel anticipating possible statin therapy)  -Aspirin 81 mg daily  -Initiating Lantus 15 units at bedtime.  Anticipate discharge on this dose  -Initiating metformin 500 mg every evening.  Discussed 7 to 10 days of this dose before increasing to twice daily dosing and slow up titration to 1 g twice daily therapy.  Anticipate discharge on this dose  -UA to evaluate for proteinuria.  Anticipate lisinopril for treatment of hypertension in the setting of diabetes  -Will need diabetic educator follow-up as well as need to establish primary care for ongoing up titration of diabetes regimen  -Discussed generally avoiding white or sweet foods in diet as these tend to contain sugars and  starches.  Complex carbohydrates preferred as they have less glycemic spike.  -With insulin dosing and blood glucose checks, please provide teaching including allowing patient to click insulin pen to appropriate dose and self administer with supervision if he feels comfortable.  Similarly, please provide teaching with blood glucose checks  -Prandial insulin at 1 unit per 20 g carbohydrate  -Medium dose sliding scale insulin as needed.  I do not anticipate discharge with prandial or sliding scale insulin with goal of simplifying regimen until follow-up  -TTE    Hypertension: Systolic blood pressure in the 179 on arrival, has since been in the 150-160 range.  Reports he had been on antihypertensive medications many years ago but blood pressure improved and he discontinued.  -UA to evaluate for proteinuria anticipating lisinopril initiation as above  -Permissive hypertension for now per stroke neurology service.  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  No stroke noted on MRI.  Stroke neurology made aware in the emergency department and recommended hospitalization for further evaluation.  Non-smoker, no alcohol.  Left facial droop: Patient with paresthesias on and off of his left face and scalp for the past 2 months or so.  No abnormal sensation to light touch currently.  Noted to have some slight left-sided facial droop, but elevation of forehead intact bilaterally.  This finding was not immediately obvious to patient or his girlfriend, but they noticed it and it does appear abnormal to them when it is pointed out.  No known history of Bell's palsy or facial nerve paralysis.  Again, no stroke on imaging, and would not correlate with vertebral artery occlusion  -Stroke neurology consulted  -EC aspirin 81 mg daily.  Patient already took 162 mg of aspirin 12/21 AM  -Lipid panel pending  -TTE  -Dysphagia screen,  speech pathology consult  -Permissive hypertension per stroke neurology for now.  Anticipate initiation of lisinopril tomorrow a.m.  -2 L LR bolus given volume depletion with lightheadedness in the setting of uncontrolled new diagnosis diabetes  -At follow-up with his primary care provider, recommended that he bring all of his nutritional supplements with for medication/supplement review.  He cannot recall what supplements he takes at this time.    Pseudohyponatremia: Serum sodium of 131, but blood glucose of 472 resulting in normal corrected serum sodium.  -2L LR bolus for volume depletion in the setting of uncontrolled DM II    Left anterior cervical chain adenopathy: Patient with approximately 1-1.5 cm area of fullness which is slightly tender under angle of jaw in the vicinity of his anterior cervical chain versus submandibular gland (gland seems less likely as deeper in neck rather than under jaw).  Has noted discomfort here for the past 2 days.  No fevers or chills, no cough or sore throat.  Has not felt ill, but had some viral illness exposure with his family last week.  No comment on MRA read at this level of any concerning finding.  -Monitor for now given somewhat recent viral exposure.  At follow-up in approximately 1-2 week if this is persisting or worsening, can pursue ultrasound imaging with subsequent biopsy if indicated.  Discussed with patient as well as his girlfriend on admission.  If adenopathy resolves, likely could be attributed to viral illness  -Strep and COVID-negative          Diet:  regular diet when passes bedside swallow eval.  DVT Prophylaxis: Pneumatic Compression Devices  Raman Catheter: Not present  Lines: None     Cardiac Monitoring: None  Code Status:  full code. Confirmed with patient on admission.    Clinically Significant Risk Factors Present on Admission         # Hyponatremia: Lowest Na = 131 mmol/L in last 2 days, will monitor as appropriate  # Hypochloremia: Lowest Cl = 91  mmol/L in last 2 days, will monitor as appropriate          # Hypertension: Noted on problem list          # DMII: A1C = 11.4 % (Ref range: <5.7 %) within past 6 months               Disposition Plan     Medically Ready for Discharge: Anticipated Tomorrow           Amauri Ruiz MD  Hospitalist Service  [facility]  Securely message with ViaView (more info)  Text page via [facility] Paging/Directory     ______________________________________________________________________    Chief Complaint   Fatigue, lightheadedness, left facial paresthesias, left facial droop, polyuria and polydipsia, left anterior cervical chain discomfort    History is obtained from the patient, chart review, discussion with Dr. Loza in the emergency department, patient's significant other at bedside, review of outside records including historical LDL in the 50s in 2019 with hemoglobin A1c of 5.9 at that time.    History of Present Illness   Roger Serrano is a 64 year old male who presents to the emergency department with both chronic and more acute symptoms.  He has very limited prior medical follow-up, and is now found to have new diagnosis of type 2 diabetes as well as a left vertebral artery occlusion.    For the past 2 years or so, patient tells me that he has been drinking water frequently and urinating frequently, approximately every 3 hours he needs to get up to urinate.    Over the past 2 months, he has had intermittent left face and scalp paresthesias where he will experiencing itching or tingling.  This comes and goes.  He is unaware of any facial droop prior to this being pointed out tonight.    For the past several weeks he has had increased fatigue.  He does have a history of fatigue in the past, and works odd hours with [employer].  Tells me he typically goes to bed around 3 in the afternoon and wakes up for work around 1 in the morning.  Recently he found himself going to bed at noon and waking up at 1, feeling  not rested.  He does recognize that he needs to get up frequently at night to urinate.    Less specific timeline, but patient has also been feeling lightheaded when standing.  This is something he has experienced in the past, actually worse several years ago.  He recognizes this as positional lightheadedness.  No vertiginous symptoms.    For the past 2 days, he has had some left anterior neck discomfort.  He thought that his facial tingling coupled with this neck discomfort might have been a problem with his carotid artery, so presented to the emergency department for evaluation.    In the emergency department, an MRA of the head and neck were performed.  No stroke, but left vertebral artery did have occlusion at origin with reconstitution at V2 segment.  He was also found to have a blood glucose in the 470s with a new diagnosis of type 2 diabetes.  He does not have a primary care doctor, but tells me that he recently enrolled in Medicare as he is turned 64 this October.    Neurology was made aware of patient's vertebral artery occlusion and recommended hospitalization for further evaluation.  Initiated on aspirin 81 mg daily.  He tells me that for the past 2 days he has taken 162 mg of aspirin, last dose 12/21/2024.    Suspected majority of patient's findings including fatigue, polyuria and polydipsia, intermittent blurred vision, lightheadedness are related or at least contributed to in some degree by his uncontrolled diabetes and progressive volume depletion.  Left cervical chain node without infectious symptoms, but I am not certain how this might tie to his left facial droop or other symptoms.  Non-smoker.  I do not appreciate any overt dental issues currently.  Did have some recent viral illness exposure with his children in the past weeks, though he himself does not feel ill.        Past Medical History    Past Medical History:   Diagnosis Date    Diabetes mellitus, new onset (H) 12/21/2024    Hypertension,  unspecified type 12/21/2024       Past Surgical History   Past Surgical History:   Procedure Laterality Date    HERNIA REPAIR      ORTHOPEDIC SURGERY         Prior to Admission Medications   Prior to Admission Medications   Prescriptions Last Dose Informant Patient Reported? Taking?   ibuprofen (ADVIL/MOTRIN) 200 MG tablet   Yes No   Sig: Take 200 mg by mouth every 4 hours as needed for mild pain   indomethacin (INDOCIN) 50 MG capsule   No No   Sig: Take 1 capsule (50 mg) by mouth 3 times daily (with meals) for 3 days      Facility-Administered Medications: None           Physical Exam   Vital Signs: Temp: 97.4  F (36.3  C)   BP: (!) 157/97 Pulse: 78   Resp: 18 SpO2: 96 %        General Appearance: Well-appearing, if overweight, 64-year-old male resting comfortably on hospital [facility].  No acute distress.  Does not appear ill or toxic.  Eyes: No scleral icterus or injection.  Extraocular motions are intact.  Pupils equal bilaterally  HEENT: Normocephalic and atraumatic, but does have some left-sided facial droop present.  Dentition intact with no areas of erythema  Respiratory: Breath sounds are clear bilaterally to auscultation  Cardiovascular: Regular rate and rhythm with no appreciable murmur  GI: Protuberant with central abdominal adiposity.  No tenderness to palpation.  No palpable mass  Lymph/Hematologic: No lower extremity swelling.  Approximately 1 to 1.5 cm area of slightly tender fullness, nonmobile, in the anterior cervical chain space near angle of jaw on the left.  Asymmetric as compared to right  Skin: No rashes or jaundice appreciated  Musculoskeletal: Muscular tone and bulk intact in all extremities appropriate for age.  Neurologic: Patient with left-sided facial droop present with loss of nasolabial fold and corner of the mouth slightly down.  Not immediately noted by patient or his significant other, but when pointed out they do believe that this is not his baseline.  Sensation to light touch intact  bilaterally along facial nerve distribution.  Eyebrow elevation intact and equal bilaterally.  Rapid finger taps, rapid alternating motions of upper extremities,  strength, resistance against flexion extension and wrists and elbows intact, shoulder shrug intact.  Hip flexors, extension at the knee intact and equal without weakness or deficits.  Extraocular motions intact.  Tongue protrusion midline, shoulder shrug intact.  Speech markus and content intact without word finding difficulty or substitution.  Unable to appreciate any focal neurologic deficits other than some facial droop  Psychiatric: Normal affect, very pleasant    Medical Decision Making       75 MINUTES SPENT BY ME on the date of service doing chart review, history, exam, documentation & further activities per the note.      Data     I have personally reviewed the following data over the past 24 hrs:    8.3  \   16.8   / 256     131 (L) 91 (L) 19.8 /  472 (H)   4.7 26 1.01 \     Trop: 17 BNP: N/A     TSH: N/A T4: N/A A1C: 11.4 (H)       Imaging results reviewed over the past 24 hrs:   Recent Results (from the past 24 hours)   MR Brain w/o & w Contrast    Narrative    EXAM: MR BRAIN W/O and W CONTRAST, MRA NECK (CAROTIDS) W/O and W CONTRAST, MRA BRAIN ([place]) W/O CONTRAST  LOCATION: [facility]  DATE: 12/21/2024    INDICATION: dizziness, numbness, feeling off  COMPARISON: None.  CONTRAST: 10mL Gadavist  TECHNIQUE:   1) Routine multiplanar multisequence head MRI without and with intravenous contrast.  2) 3D time-of-flight head MRA without intravenous contrast.  3) Neck MRA without and with IV contrast. Stenosis measurements made according to NASCET criteria unless otherwise specified.    FINDINGS:  MRI BRAIN:  No abnormal restricted diffusion to suggest acute infarct. Brain signal morphology are normal. The ventricles and sulci are normal for age. No evidence of acute intracranial hemorrhage, mass effect, or  extra-axial collection. No abnormal brain   parenchymal or leptomeningeal enhancement. No cerebellar tonsillar ectopia.    Expected signal voids within the distal vertebral, basilar, and bilateral internal carotid arteries.    The globes are unremarkable. The partially imaged paranasal sinuses, mastoid air cells and middle ear cavities are unremarkable. The visualized skull base and calvarium are unremarkable.    MRA HEAD:    Examination of the anterior circulation demonstrates flow within the bilateral internal carotid and proximal aspects of the anterior middle cerebral arteries. The anterior communicating artery is demonstrated.    Examination of the posterior circulation demonstrates flow within the distal vertebral, basilar, proximal aspects of the posterior cerebral arteries. The right and left posterior communicating arteries are not demonstrated with certainty.    No evidence of pathologic vascular occlusion or saccular aneurysm within the visualized intrarenal circulation by MR angiography.    MRA NECK:  The aortic arch has normal branching configuration. There is flow within the brachiocephalic, common carotid, proximal aspects of the subclavian arteries.    The right common carotid artery is patent. Examination of the right carotid bulb demonstrates no evidence of hemodynamically significant stenosis within the right internal carotid artery within the neck.    The left common carotid artery is patent. Examination of the left carotid bulb demonstrates no evidence of hemodynamically significant stenosis within the left internal carotid artery within the neck.    The right vertebral artery is patent. 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      Impression    IMPRESSION:  MRI BRAIN:  1.  No evidence of acute intracranial hemorrhage, mass effect, or infarction.    MRA HEAD:  1.  No evidence of  pathologic vascular occlusion or saccular aneurysm within the visualized intracranial circulation by MR angiography.    MRA NECK:  1.  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  2.  No evidence of hemodynamically significant stenosis within either internal carotid artery within the neck.   MRA Angiogram Head w/o Contrast    Narrative    EXAM: MR BRAIN W/O and W CONTRAST, MRA NECK (CAROTIDS) W/O and W CONTRAST, MRA BRAIN ([place]) W/O CONTRAST  LOCATION: [facility]  DATE: 12/21/2024    INDICATION: dizziness, numbness, feeling off  COMPARISON: None.  CONTRAST: 10mL Gadavist  TECHNIQUE:   1) Routine multiplanar multisequence head MRI without and with intravenous contrast.  2) 3D time-of-flight head MRA without intravenous contrast.  3) Neck MRA without and with IV contrast. Stenosis measurements made according to NASCET criteria unless otherwise specified.    FINDINGS:  MRI BRAIN:  No abnormal restricted diffusion to suggest acute infarct. Brain signal morphology are normal. The ventricles and sulci are normal for age. No evidence of acute intracranial hemorrhage, mass effect, or extra-axial collection. No abnormal brain   parenchymal or leptomeningeal enhancement. No cerebellar tonsillar ectopia.    Expected signal voids within the distal vertebral, basilar, and bilateral internal carotid arteries.    The globes are unremarkable. The partially imaged paranasal sinuses, mastoid air cells and middle ear cavities are unremarkable. The visualized skull base and calvarium are unremarkable.    MRA HEAD:    Examination of the anterior circulation demonstrates flow within the bilateral internal carotid and proximal aspects of the anterior middle cerebral arteries. The anterior communicating artery is demonstrated.    Examination of the posterior circulation demonstrates flow  within the distal vertebral, basilar, proximal aspects of the posterior cerebral arteries. The right and left posterior communicating arteries are not demonstrated with certainty.    No evidence of pathologic vascular occlusion or saccular aneurysm within the visualized intrarenal circulation by MR angiography.    MRA NECK:  The aortic arch has normal branching configuration. There is flow within the brachiocephalic, common carotid, proximal aspects of the subclavian arteries.    The right common carotid artery is patent. Examination of the right carotid bulb demonstrates no evidence of hemodynamically significant stenosis within the right internal carotid artery within the neck.    The left common carotid artery is patent. Examination of the left carotid bulb demonstrates no evidence of hemodynamically significant stenosis within the left internal carotid artery within the neck.    The right vertebral artery is patent. 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      Impression    IMPRESSION:  MRI BRAIN:  1.  No evidence of acute intracranial hemorrhage, mass effect, or infarction.    MRA HEAD:  1.  No evidence of pathologic vascular occlusion or saccular aneurysm within the visualized intracranial circulation by MR angiography.    MRA NECK:  1.  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  2.  No evidence of hemodynamically significant stenosis within either internal carotid artery within the neck.   MRA Angiogram Neck w/o & w Contrast    Narrative    EXAM: MR BRAIN W/O and W CONTRAST, MRA NECK (CAROTIDS) W/O and W CONTRAST, MRA BRAIN ([place]) W/O CONTRAST  LOCATION: [facility]  DATE: 12/21/2024    INDICATION: dizziness, numbness, feeling off  COMPARISON:  None.  CONTRAST: 10mL Gadavist  TECHNIQUE:   1) Routine multiplanar multisequence head MRI without and with intravenous contrast.  2) 3D time-of-flight head MRA without intravenous contrast.  3) Neck MRA without and with IV contrast. Stenosis measurements made according to NASCET criteria unless otherwise specified.    FINDINGS:  MRI BRAIN:  No abnormal restricted diffusion to suggest acute infarct. Brain signal morphology are normal. The ventricles and sulci are normal for age. No evidence of acute intracranial hemorrhage, mass effect, or extra-axial collection. No abnormal brain   parenchymal or leptomeningeal enhancement. No cerebellar tonsillar ectopia.    Expected signal voids within the distal vertebral, basilar, and bilateral internal carotid arteries.    The globes are unremarkable. The partially imaged paranasal sinuses, mastoid air cells and middle ear cavities are unremarkable. The visualized skull base and calvarium are unremarkable.    MRA HEAD:    Examination of the anterior circulation demonstrates flow within the bilateral internal carotid and proximal aspects of the anterior middle cerebral arteries. The anterior communicating artery is demonstrated.    Examination of the posterior circulation demonstrates flow within the distal vertebral, basilar, proximal aspects of the posterior cerebral arteries. The right and left posterior communicating arteries are not demonstrated with certainty.    No evidence of pathologic vascular occlusion or saccular aneurysm within the visualized intrarenal circulation by MR angiography.    MRA NECK:  The aortic arch has normal branching configuration. There is flow within the brachiocephalic, common carotid, proximal aspects of the subclavian arteries.    The right common carotid artery is patent. Examination of the right carotid bulb demonstrates no evidence of hemodynamically significant stenosis within the right internal carotid artery within the neck.    The left  common carotid artery is patent. Examination of the left carotid bulb demonstrates no evidence of hemodynamically significant stenosis within the left internal carotid artery within the neck.    The right vertebral artery is patent. 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      Impression    IMPRESSION:  MRI BRAIN:  1.  No evidence of acute intracranial hemorrhage, mass effect, or infarction.    MRA HEAD:  1.  No evidence of pathologic vascular occlusion or saccular aneurysm within the visualized intracranial circulation by MR angiography.    MRA NECK:  1.  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  2.  No evidence of hemodynamically significant stenosis within either internal carotid artery within the neck.

## 2024-12-22 NOTE — PLAN OF CARE
Reason for Admission: Left vertebral artery occlusion    Cognitive/Mentation: A/Ox 4  Neuros/CMS: Left facial droop  VS: stable on RA, Keep SBP <220.   Tele: NSR.  /GI: Continent. Last BM 12/21.   Pulmonary: LS clear.  Pain: denies.     Drains/Lines: PIV-SL  Skin: intact  Activity: Independent in room  Diet: Mod carb with thin liquids. Takes pills whole.     Therapies recs: ST  Discharge: pending    Aggression Stoplight Tool: green    End of shift summary: Received per stretcher from ED, ambulated to his bed, made comfortable. Girlfriend requesting to be called during rounds. awaiting Echo

## 2024-12-22 NOTE — ED NOTES
[facility]  ED Nurse Handoff Report    ED Chief complaint: Dizziness and Neck Pain      ED Diagnosis:   Final diagnoses:   Diabetes mellitus, new onset (H)   Facial paresthesia   Hypertension, unspecified type       Code Status: Full Code    Allergies: No Known Allergies    Patient Story:  64 year old male with no notable health history presents with a variety of symptoms.  He notes he has been having tingling to the left face and scalp for couple of months that comes and goes predominantly on the left side that comes and goes also noting his vision has been off for couple of weeks and overall he feels off.  He notes he been feeling fatigued off balance and lightheaded.  He also notes now for few days been having neck pain right at the angle of his jaw.    Focused Assessment: R jaw pain and visual disturbances.    Treatments and/or interventions provided: Labs, IVF, IMaging  Patient's response to treatments and/or interventions: Pt was about to be discharged when Neurocalled and wanted him admitted for further monitoring  Results for orders placed or performed during the hospital encounter of 12/21/24   MR Brain w/o & w Contrast     Status: None    Narrative    EXAM: MR BRAIN W/O and W CONTRAST, MRA NECK (CAROTIDS) W/O and W CONTRAST, MRA BRAIN ([place]) W/O CONTRAST  LOCATION: [facility]  DATE: 12/21/2024    INDICATION: dizziness, numbness, feeling off  COMPARISON: None.  CONTRAST: 10mL Gadavist  TECHNIQUE:   1) Routine multiplanar multisequence head MRI without and with intravenous contrast.  2) 3D time-of-flight head MRA without intravenous contrast.  3) Neck MRA without and with IV contrast. Stenosis measurements made according to NASCET criteria unless otherwise specified.    FINDINGS:  MRI BRAIN:  No abnormal restricted diffusion to suggest acute infarct. Brain signal morphology are normal. The ventricles and sulci are normal for age. No evidence of acute  intracranial hemorrhage, mass effect, or extra-axial collection. No abnormal brain   parenchymal or leptomeningeal enhancement. No cerebellar tonsillar ectopia.    Expected signal voids within the distal vertebral, basilar, and bilateral internal carotid arteries.    The globes are unremarkable. The partially imaged paranasal sinuses, mastoid air cells and middle ear cavities are unremarkable. The visualized skull base and calvarium are unremarkable.    MRA HEAD:    Examination of the anterior circulation demonstrates flow within the bilateral internal carotid and proximal aspects of the anterior middle cerebral arteries. The anterior communicating artery is demonstrated.    Examination of the posterior circulation demonstrates flow within the distal vertebral, basilar, proximal aspects of the posterior cerebral arteries. The right and left posterior communicating arteries are not demonstrated with certainty.    No evidence of pathologic vascular occlusion or saccular aneurysm within the visualized intrarenal circulation by MR angiography.    MRA NECK:  The aortic arch has normal branching configuration. There is flow within the brachiocephalic, common carotid, proximal aspects of the subclavian arteries.    The right common carotid artery is patent. Examination of the right carotid bulb demonstrates no evidence of hemodynamically significant stenosis within the right internal carotid artery within the neck.    The left common carotid artery is patent. Examination of the left carotid bulb demonstrates no evidence of hemodynamically significant stenosis within the left internal carotid artery within the neck.    The right vertebral artery is patent. 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      Impression    IMPRESSION:  MRI BRAIN:  1.  No evidence of acute intracranial hemorrhage, mass effect, or  infarction.    MRA HEAD:  1.  No evidence of pathologic vascular occlusion or saccular aneurysm within the visualized intracranial circulation by MR angiography.    MRA NECK:  1.  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  2.  No evidence of hemodynamically significant stenosis within either internal carotid artery within the neck.   MRA Angiogram Head w/o Contrast     Status: None    Narrative    EXAM: MR BRAIN W/O and W CONTRAST, MRA NECK (CAROTIDS) W/O and W CONTRAST, MRA BRAIN ([place]) W/O CONTRAST  LOCATION: [facility]  DATE: 12/21/2024    INDICATION: dizziness, numbness, feeling off  COMPARISON: None.  CONTRAST: 10mL Gadavist  TECHNIQUE:   1) Routine multiplanar multisequence head MRI without and with intravenous contrast.  2) 3D time-of-flight head MRA without intravenous contrast.  3) Neck MRA without and with IV contrast. Stenosis measurements made according to NASCET criteria unless otherwise specified.    FINDINGS:  MRI BRAIN:  No abnormal restricted diffusion to suggest acute infarct. Brain signal morphology are normal. The ventricles and sulci are normal for age. No evidence of acute intracranial hemorrhage, mass effect, or extra-axial collection. No abnormal brain   parenchymal or leptomeningeal enhancement. No cerebellar tonsillar ectopia.    Expected signal voids within the distal vertebral, basilar, and bilateral internal carotid arteries.    The globes are unremarkable. The partially imaged paranasal sinuses, mastoid air cells and middle ear cavities are unremarkable. The visualized skull base and calvarium are unremarkable.    MRA HEAD:    Examination of the anterior circulation demonstrates flow within the bilateral internal carotid and proximal aspects of the anterior middle cerebral arteries. The anterior communicating artery is  demonstrated.    Examination of the posterior circulation demonstrates flow within the distal vertebral, basilar, proximal aspects of the posterior cerebral arteries. The right and left posterior communicating arteries are not demonstrated with certainty.    No evidence of pathologic vascular occlusion or saccular aneurysm within the visualized intrarenal circulation by MR angiography.    MRA NECK:  The aortic arch has normal branching configuration. There is flow within the brachiocephalic, common carotid, proximal aspects of the subclavian arteries.    The right common carotid artery is patent. Examination of the right carotid bulb demonstrates no evidence of hemodynamically significant stenosis within the right internal carotid artery within the neck.    The left common carotid artery is patent. Examination of the left carotid bulb demonstrates no evidence of hemodynamically significant stenosis within the left internal carotid artery within the neck.    The right vertebral artery is patent. 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      Impression    IMPRESSION:  MRI BRAIN:  1.  No evidence of acute intracranial hemorrhage, mass effect, or infarction.    MRA HEAD:  1.  No evidence of pathologic vascular occlusion or saccular aneurysm within the visualized intracranial circulation by MR angiography.    MRA NECK:  1.  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  2.  No evidence of hemodynamically significant stenosis within either internal carotid artery within the neck.   MRA Angiogram Neck w/o & w Contrast     Status: None    Narrative    EXAM: MR BRAIN W/O and W CONTRAST, MRA NECK (CAROTIDS) W/O and W CONTRAST, MRA BRAIN ([place]) W/O CONTRAST  LOCATION: [facility]  DATE: 12/21/2024    INDICATION: dizziness, numbness, feeling off  COMPARISON: None.  CONTRAST: 10mL Gadavist  TECHNIQUE:   1) Routine multiplanar multisequence head MRI without and with intravenous contrast.  2) 3D time-of-flight head MRA without intravenous contrast.  3) Neck MRA without and with IV contrast. Stenosis measurements made according to NASCET criteria unless otherwise specified.    FINDINGS:  MRI BRAIN:  No abnormal restricted diffusion to suggest acute infarct. Brain signal morphology are normal. The ventricles and sulci are normal for age. No evidence of acute intracranial hemorrhage, mass effect, or extra-axial collection. No abnormal brain   parenchymal or leptomeningeal enhancement. No cerebellar tonsillar ectopia.    Expected signal voids within the distal vertebral, basilar, and bilateral internal carotid arteries.    The globes are unremarkable. The partially imaged paranasal sinuses, mastoid air cells and middle ear cavities are unremarkable. The visualized skull base and calvarium are unremarkable.    MRA HEAD:    Examination of the anterior circulation demonstrates flow within the bilateral internal carotid and proximal aspects of the anterior middle cerebral arteries. The anterior communicating artery is demonstrated.    Examination of the posterior circulation demonstrates flow within the distal vertebral, basilar, proximal aspects of the posterior cerebral arteries. The right and left posterior communicating arteries are not demonstrated with certainty.    No evidence of pathologic vascular occlusion or saccular aneurysm within the visualized intrarenal circulation by MR angiography.    MRA NECK:  The aortic arch has normal branching configuration. There is flow within the brachiocephalic, common carotid, proximal aspects of the subclavian arteries.    The right common carotid artery is patent. Examination of the right carotid bulb demonstrates no evidence of  hemodynamically significant stenosis within the right internal carotid artery within the neck.    The left common carotid artery is patent. Examination of the left carotid bulb demonstrates no evidence of hemodynamically significant stenosis within the left internal carotid artery within the neck.    The right vertebral artery is patent. 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      Impression    IMPRESSION:  MRI BRAIN:  1.  No evidence of acute intracranial hemorrhage, mass effect, or infarction.    MRA HEAD:  1.  No evidence of pathologic vascular occlusion or saccular aneurysm within the visualized intracranial circulation by MR angiography.    MRA NECK:  1.  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  2.  No evidence of hemodynamically significant stenosis within either internal carotid artery within the neck.   Basic metabolic panel     Status: Abnormal   Result Value Ref Range    Sodium 131 (L) 135 - 145 mmol/L    Potassium 4.7 3.4 - 5.3 mmol/L    Chloride 91 (L) 98 - 107 mmol/L    Carbon Dioxide (CO2) 26 22 - 29 mmol/L    Anion Gap 14 7 - 15 mmol/L    Urea Nitrogen 19.8 8.0 - 23.0 mg/dL    Creatinine 1.01 0.67 - 1.17 mg/dL    GFR Estimate 83 >60 mL/min/1.73m2    Calcium 9.0 8.8 - 10.4 mg/dL    Glucose 472 (H) 70 - 99 mg/dL   Troponin T, High Sensitivity     Status: Normal   Result Value Ref Range    Troponin T, High Sensitivity 17 <=22 ng/L   CBC with platelets and differential     Status: None   Result Value Ref Range    WBC Count 8.3 4.0 - 11.0 10e3/uL    RBC Count 5.39 4.40 - 5.90 10e6/uL    Hemoglobin 16.8 13.3 - 17.7 g/dL    Hematocrit 46.5 40.0 - 53.0 %    MCV 86 78 - 100 fL    MCH 31.2 26.5 - 33.0 pg    MCHC 36.1 31.5 - 36.5 g/dL    RDW 11.9 10.0 - 15.0 %    Platelet Count  256 150 - 450 10e3/uL    % Neutrophils 51 %    % Lymphocytes 39 %    % Monocytes 8 %    % Eosinophils 1 %    % Basophils 0 %    % Immature Granulocytes 0 %    NRBCs per 100 WBC 0 <1 /100    Absolute Neutrophils 4.3 1.6 - 8.3 10e3/uL    Absolute Lymphocytes 3.2 0.8 - 5.3 10e3/uL    Absolute Monocytes 0.7 0.0 - 1.3 10e3/uL    Absolute Eosinophils 0.1 0.0 - 0.7 10e3/uL    Absolute Basophils 0.0 0.0 - 0.2 10e3/uL    Absolute Immature Granulocytes 0.0 <=0.4 10e3/uL    Absolute NRBCs 0.0 10e3/uL   [place] Draw     Status: None    Narrative    The following orders were created for panel order [place] Draw.  Procedure                               Abnormality         Status                     ---------                               -----------         ------                     Extra Blue Top Tube[[id number]]                              Final result                 Please view results for these tests on the individual orders.   Extra Blue Top Tube     Status: None   Result Value Ref Range    Hold Specimen [facility]    Blood gas venous     Status: Abnormal   Result Value Ref Range    pH Venous 7.39 7.32 - 7.43    pCO2 Venous 43 40 - 50 mm Hg    pO2 Venous 44 25 - 47 mm Hg    Bicarbonate Venous 26 21 - 28 mmol/L    Base Excess/Deficit Venous 1.0 -3.0 - 3.0 mmol/L    FIO2 0     Oxyhemoglobin Venous 80 (H) 70 - 75 %    O2 Sat, Venous 81.4 (H) 70.0 - 75.0 %    Narrative    In healthy individuals, oxyhemoglobin (O2Hb) and oxygen saturation (SO2) are approximately equal. In the presence of dyshemoglobins, oxyhemoglobin can be considerably lower than oxygen saturation.   Ketone Beta-Hydroxybutyrate Quantitative     Status: Normal   Result Value Ref Range    Ketone (Beta-Hydroxybutyrate) Quantitative 0.21 <=0.30 mmol/L   Hemoglobin A1c     Status: Abnormal   Result Value Ref Range    Estimated Average Glucose 280 (H) <117 mg/dL    Hemoglobin A1C 11.4 (H) <5.7 %   Influenza A/B, RSV and SARS-CoV2 PCR (COVID-19) Nasopharyngeal     Status:  Normal    Specimen: Nasopharyngeal; Swab   Result Value Ref Range    Influenza A PCR Negative Negative    Influenza B PCR Negative Negative    RSV PCR Negative Negative    SARS CoV2 PCR Negative Negative    Narrative    Testing was performed using the Xpert Xpress CoV2/Flu/RSV Assay on the C[employer] GeneXpert Instrument. This test should be ordered for the detection of SARS-CoV2, influenza, and RSV viruses in individuals with signs and symptoms of respiratory tract infection. This test is for in vitro diagnostic use under the [place] FDA for laboratories certified under CLIA to perform high or moderate complexity testing. This test has been [place] FDA cleared. A negative result does not rule out the presence of PCR inhibitors in the specimen or target RNA in concentration below the limit of detection for the assay. If only one viral target is positive but coinfection with multiple targets is suspected, the sample should be re-tested with another FDA cleared, approved, or authorized test, if coninfection would change clinical management. This test was validated by the [facility]. These laboratories are certified under the Clinical Laboratory Improvement Amendments of 1988 (CLIA-88) as qualified to perfom high complexity laboratory testing.   EKG 12-lead, tracing only     Status: None   Result Value Ref Range    Systolic Blood Pressure  mmHg    Diastolic Blood Pressure  mmHg    Ventricular Rate 91 BPM    Atrial Rate 91 BPM    WI Interval 130 ms    QRS Duration 92 ms     ms    QTc 430 ms    P Axis 74 degrees    R AXIS 50 degrees    T Axis 28 degrees    Interpretation ECG       Sinus rhythm  Normal ECG  No previous ECGs available  Confirmed by GENERATED REPORT, COMPUTER ([id number]),  Gary Mazariegos ([id number]) on 12/21/2024 3:57:37 PM     Group A Streptococcus PCR Throat Swab     Status: Normal    Specimen: Throat; Swab   Result Value Ref Range    Group A strep by PCR Not Detected Not Detected     Narrative    The Xpert Xpress Strep A test, performed on the [employer], is a rapid, qualitative in vitro diagnostic test for the detection of Streptococcus pyogenes (Group A ß-hemolytic Streptococcus, Strep A) in throat swab specimens from patients with signs and symptoms of pharyngitis. The Xpert Xpress Strep A test can be used as an aid in the diagnosis of Group A Streptococcal pharyngitis. The assay is not intended to monitor treatment for Group A Streptococcus infections. The Xpert Xpress Strep A test utilizes an automated real-time polymerase chain reaction (PCR) to detect Streptococcus pyogenes DNA.   CBC with platelets + differential     Status: None    Narrative    The following orders were created for panel order CBC with platelets + differential.  Procedure                               Abnormality         Status                     ---------                               -----------         ------                     CBC with platelets and d...[[id number]]                      Final result                 Please view results for these tests on the individual orders.       To be done/followed up on inpatient unit:  IN Pt prders    Does this patient have any cognitive concerns?:  None    Activity level - Baseline/Home:  Independent  Activity Level - Current:   Independent    Patient's Preferred language: English   Needed?: No    Isolation: None  Infection: Not Applicable  Patient tested for COVID 19 prior to admission: NO  Bariatric?: No    Vital Signs:   Vitals:    12/21/24 2015 12/21/24 2030 12/21/24 2045 12/21/24 2100   BP: (!) 164/99 (!) 157/96 (!) 154/96 (!) 157/97   Pulse:  77 77 78   Resp:       Temp:       SpO2:  98% 96% 96%       Cardiac Rhythm:     Was the PSS-3 completed:   Yes  What interventions are required if any?               Family Comments: Son and wife went home  OBS brochure/video discussed/provided to patient/family: Yes              Name of person given  brochure if not patient:               Relationship to patient:     For the majority of the shift this patient's behavior was Green.   Behavioral interventions performed were none.    ED NURSE PHONE NUMBER: *[phone]

## 2024-12-22 NOTE — PHARMACY-ADMISSION MEDICATION HISTORY
Pharmacist Admission Medication History    Admission medication history is complete. The information provided in this note is only as accurate as the sources available at the time of the update.    Information Source(s): Patient via in-person    Pertinent Information:   1) Patient states he takes supplements on and off but can't remember what they are.    2) Patient states he took a couple aspirin yesterday and today but typically does not take.       Medication History Completed By: Syeda Mejia, PharmKALLIE 12/21/2024 9:52 PM

## 2024-12-23 VITALS
DIASTOLIC BLOOD PRESSURE: 89 MMHG | WEIGHT: 188.71 LBS | RESPIRATION RATE: 18 BRPM | HEART RATE: 76 BPM | TEMPERATURE: 98.1 F | HEIGHT: 72 IN | SYSTOLIC BLOOD PRESSURE: 148 MMHG | BODY MASS INDEX: 25.56 KG/M2 | OXYGEN SATURATION: 98 %

## 2024-12-23 LAB
GLUCOSE BLDC GLUCOMTR-MCNC: 236 MG/DL (ref 70–99)
GLUCOSE BLDC GLUCOMTR-MCNC: 266 MG/DL (ref 70–99)
GLUCOSE BLDC GLUCOMTR-MCNC: 283 MG/DL (ref 70–99)

## 2024-12-23 PROCEDURE — 250N000013 HC RX MED GY IP 250 OP 250 PS 637: Performed by: INTERNAL MEDICINE

## 2024-12-23 PROCEDURE — G0378 HOSPITAL OBSERVATION PER HR: HCPCS

## 2024-12-23 PROCEDURE — 99239 HOSP IP/OBS DSCHRG MGMT >30: CPT | Performed by: HOSPITALIST

## 2024-12-23 PROCEDURE — 250N000013 HC RX MED GY IP 250 OP 250 PS 637: Performed by: HOSPITALIST

## 2024-12-23 PROCEDURE — 82962 GLUCOSE BLOOD TEST: CPT

## 2024-12-23 RX ORDER — ASPIRIN 81 MG/1
81 TABLET ORAL DAILY
Qty: 30 TABLET | Refills: 0 | Status: SHIPPED | OUTPATIENT
Start: 2024-12-24 | End: 2025-01-23

## 2024-12-23 RX ORDER — LISINOPRIL 10 MG/1
10 TABLET ORAL DAILY
Qty: 30 TABLET | Refills: 0 | Status: SHIPPED | OUTPATIENT
Start: 2024-12-23

## 2024-12-23 RX ORDER — ATORVASTATIN CALCIUM 10 MG/1
10 TABLET, FILM COATED ORAL EVERY EVENING
Qty: 30 TABLET | Refills: 0 | Status: SHIPPED | OUTPATIENT
Start: 2024-12-23 | End: 2025-01-22

## 2024-12-23 RX ADMIN — ASPIRIN 81 MG: 81 TABLET, COATED ORAL at 09:44

## 2024-12-23 RX ADMIN — LISINOPRIL 10 MG: 10 TABLET ORAL at 09:44

## 2024-12-23 RX ADMIN — METFORMIN HYDROCHLORIDE 500 MG: 500 TABLET ORAL at 09:45

## 2024-12-23 ASSESSMENT — ACTIVITIES OF DAILY LIVING (ADL)
ADLS_ACUITY_SCORE: 25

## 2024-12-23 NOTE — PROVIDER NOTIFICATION
Paged Hospitalist re: glucose monitoring and dosing metformi, any work restrictions - will await further advisement.     MD responded, no work restrictions.Try to do metformin with meals about 12 hrs apart.

## 2024-12-23 NOTE — PROVIDER NOTIFICATION
OBSERVATION NOTE:   Free from ACUTE neuro deficits YES   Testing complete YES  Will await further advisement from MD regarding discharge plan.

## 2024-12-23 NOTE — PROGRESS NOTES
Patient seen and examined    - Neurology suggested no further stroke workup and have signed off (12/22)  - blood glucose in 280s; will increase Lantus to 18 units daily and metformin to 500 mg PO BID for discharge  - BP better with addition of 10 mg lisinopril  - ECHO 12/22 noted unremarkable with LVEF 55-60%, trace mitral regurgitation, aortic root dilation 38 mm    Discharge home today    Please see discharge summary for details

## 2024-12-23 NOTE — PLAN OF CARE
Goal Outcome Evaluation:      Plan of Care Reviewed With: patient        Ruled out stroke/Left vertebral artery occlusion. Neuros - alert & oriented, following commands, no dizziness, no facial droop,  no numbness or tingling. No cough or shortness of breath. VSS, RA Tele - SR. Moderate carb diet/good appetite/meds whole with water, blood sugar monitoring, sliding scale insulin & carb count insulin provided.  Up independently in room. Continent of bladder & bowels. Denies pain. Pt scoring green on the Aggression Stop Light Tool. Planning to discharge home this afternoon. Awaiting prescriptions from [facility] discharge pharmacy, including new glucometer. Will need diabetic review and instructions with new meter. AVS printed & reviewed with patient, if changes made please reprint. Patient agrees with plan.

## 2024-12-23 NOTE — PLAN OF CARE
Goal Outcome Evaluation:    Educated provided on glucometer and Lantus pen. Patient demonstrated ability to use both glucometer and the Lantus pen. Patient instructed on monitoring BG and BP, and following up with primary care provider. AVS questions answered. Patient denies having any further questions at this time. All belongings returned to patient. IV removed and tele removed before discharge. Discharged to door 2 to discharge home with son.

## 2024-12-23 NOTE — PLAN OF CARE
Patient A&Ox4, PERRLA, equal and strong. NSR, BP stable 130-140 systolic. Normothermic. LS clear. Pulse palpable. No skin issues. Mod carb diet. Up ad sarah. Plan to discharge today.   Problem: Adult Inpatient Plan of Care  Goal: Plan of Care Review  Description: The Plan of Care Review/Shift note should be completed every shift.  The Outcome Evaluation is a brief statement about your assessment that the patient is improving, declining, or no change.  This information will be displayed automatically on your shift  note.  Outcome: Met  Flowsheets (Taken 12/23/2024 0514)  Plan of Care Reviewed With: patient  Overall Patient Progress: improving  Goal: Absence of Hospital-Acquired Illness or Injury  Intervention: Identify and Manage Fall Risk  Recent Flowsheet Documentation  Taken 12/23/2024 0445 by Angelica Golden RN  Safety Promotion/Fall Prevention:   clutter free environment maintained   nonskid shoes/slippers when out of bed   lighting adjusted   room organization consistent   safety round/check completed  Taken 12/23/2024 0100 by Angelica Golden RN  Safety Promotion/Fall Prevention:   clutter free environment maintained   nonskid shoes/slippers when out of bed   lighting adjusted   room organization consistent   safety round/check completed  Intervention: Prevent Skin Injury  Recent Flowsheet Documentation  Taken 12/23/2024 0445 by Angelica Golden RN  Body Position: position changed independently  Skin Protection:   adhesive use limited   tubing/devices free from skin contact  Taken 12/23/2024 0100 by Angelica Golden RN  Body Position: position changed independently  Skin Protection:   adhesive use limited   tubing/devices free from skin contact  Intervention: Prevent and Manage VTE (Venous Thromboembolism) Risk  Recent Flowsheet Documentation  Taken 12/23/2024 0445 by Angelica Golden RN  VTE Prevention/Management: SCDs off (sequential compression devices)  Taken 12/23/2024 0100 by Angelica Golden RN  VTE  Prevention/Management: SCDs off (sequential compression devices)  Intervention: Prevent Infection  Recent Flowsheet Documentation  Taken 12/23/2024 0445 by Angelica Golden, RN  Infection Prevention: rest/sleep promoted  Taken 12/23/2024 0100 by Angelica Golden, RN  Infection Prevention: rest/sleep promoted   Goal Outcome Evaluation:      Plan of Care Reviewed With: patient    Overall Patient Progress: improvingOverall Patient Progress: improving

## 2024-12-23 NOTE — DISCHARGE SUMMARY
[facility]  Discharge Summary        Roger Serrano MRN# [id number]   YOB: 1960 Age: 64 year old     Date of Admission:  12/21/2024  Date of Discharge:  12/23/2024  Admitting Physician:  No admitting provider for patient encounter.  Discharge Physician: Alfonso Obrien MD  Discharging Service: Hospitalist     Primary Provider: No Ref-Primary, Physician  Primary Care Physician Phone Number: None         Discharge Diagnoses/Problem Oriented Hospital Course (Providers):    Roger Serrano was admitted on 12/21/2024 by No admitting provider for patient encounter. and I would refer you to their history and physical.  The following problems were addressed during his hospitalization:    Roger Serrano is a 64 year old male with limited prior medical follow-up admitted on 12/21/2024. He presented with complaints of on and off left-sided facial paresthesias for the past 2 months or so, a year or more of polyuria and polydipsia, fatigue, lightheadedness, and now 2 or so days of right anterior cervical chain tenderness.  He is found to have a diminutive left vertebral artery with occlusion but no finding of stroke as well as a new diagnosis of type 2 diabetes with a hemoglobin A1c of 11+.     Type 2 diabetes, uncontrolled, new diagnosis:   - HbA1c was 5.9 in 2019, now 11.4; symptomatic hyperglycemia; On and off blurred vision as well as lightheadedness may be related to his uncontrolled diabetes  - LDL 94; normal LFTs  - started on Lantus 18 units at bedtime, metformin 500 mg bid; might need further titration of insulin and metformin on follow-up  - Aspirin 81 mg daily for secondary prevention  - UA with no proteinuria  - Will need diabetic educator follow-up as well as need to establish primary care for ongoing up titration of diabetes regimen  - ordered diabetic supplies for discharge   - continue Prandial insulin at 1 unit per 20 g carbohydrate for now but do not plan to discharge on  mealtime insulin to simplify regimen      Hypertension:   - BP in 160s--180s; not on any antihypertensives PTA   - started Lisinopril 10 mg daily     Incidental Left vertebral artery occlusion:   - Patient with paresthesias on and off of his left face and scalp for the past 2 months or so; on presentation was also noted with slight left facial droop  -MRI noted with no stroke  -MRA neck noted Occlusion of the left vertebral artery at its origin with distal reconstitution of the V2 segment which is diminutive throughout the remainder of its course.  No evidence of hemodynamically significant stenosis within either internal carotid artery within the neck  - Stroke neurology followed; suggested no further stroke workup and have signed off (12/22)  - continue aspirin 81 mg daily  - LDL 94; started low dose Lipitor 10 mg daily per maryuri recs  - cleared by SLP; patient independent and no therapy needs     Pseudohyponatremia:   - Serum sodium of 131-- corrects to about 136 for BG in 400s     Left anterior cervical chain adenopathy: Patient with approximately 1-1.5 cm area of fullness which is slightly tender under angle of jaw in the vicinity of his anterior cervical chain versus submandibular gland (gland seems less likely as deeper in neck rather than under jaw).  Has noted discomfort here for the past 2 days.  No fevers or chills, no cough or sore throat.  Has not felt ill, but had some viral illness exposure with his family last week.  No comment on MRA read at this level of any concerning finding.  -Monitor for now given somewhat recent viral exposure.  At follow-up in approximately 1-2 week if this is persisting or worsening, can pursue ultrasound imaging with subsequent biopsy if indicated.  Discussed with patient as well as his girlfriend on admission.  If adenopathy resolves, likely could be attributed to viral illness  -Strep and COVID-negative     Code Status:  full code    Clinically Significant Risk Factors  Present on Admission         # Hyponatremia: Lowest Na = 131 mmol/L in last 2 days, will monitor as appropriate  # Hypochloremia: Lowest Cl = 91 mmol/L in last 2 days, will monitor as appropriate            # Hypertension: Noted on problem list            # DMII: A1C = 11.4 % (Ref range: <5.7 %) within past 6 months    # Overweight: Estimated body mass index is 25.59 kg/m  as calculated from the following:    Height as of this encounter: 1.829 m (6').    Weight as of this encounter: 85.6 kg (188 lb 11.4 oz).                           Brief Hospital Stay Summary Sent Home With Patient in AVS:        Reason for your hospital stay      You were admitted for evaluation of new onset diabetes and elevated blood   pressure.                Pending Results:        Unresulted Labs Ordered in the Past 30 Days of this Admission       No orders found from 11/21/2024 to 12/22/2024.              Discharge Instructions and Follow-Up:      Follow-up Appointments     Hospital to Primary Care - Establish PCP Referral      Please be aware that coverage of these services is subject to the terms   and limitations of your health insurance plan.  Call member services at   your health plan with any benefit or coverage questions.    Schedule Primary Care visit within: 7 Days   Recommended labs and Imaging (to be ordered by Primary Care Provider):   Repeat BMP               Discharge Disposition:      Discharged to home        Discharge Medications:        Current Discharge Medication List        START taking these medications    Details   aspirin 81 MG EC tablet Take 1 tablet (81 mg) by mouth daily.  Qty: 30 tablet, Refills: 0    Comments: Future refills by PCP Dr. Physician No Ref-Primary with phone number None.  Associated Diagnoses: Vertebral artery occlusion, left      atorvastatin (LIPITOR) 10 MG tablet Take 1 tablet (10 mg) by mouth every evening.  Qty: 30 tablet, Refills: 0    Comments: Future refills by PCP Dr. Physician Bhandari  Ref-Primary with phone number None.  Associated Diagnoses: Vertebral artery occlusion, left      blood glucose (NO BRAND SPECIFIED) test strip Use to test blood sugar 4 times daily or as directed.  Qty: 100 strip, Refills: 6    Comments: To accompany: glucometer per insurance.  Associated Diagnoses: Diabetes mellitus, new onset (H)      !! blood glucose monitoring (NO BRAND SPECIFIED) meter device kit Use to test blood sugar 4 times daily or as directed.  Qty: 1 kit, Refills: 0    Comments: Preferred blood glucose meter OR supplies to accompany: glucometer per insurance  Associated Diagnoses: Diabetes mellitus, new onset (H)      !! blood glucose monitoring (NO BRAND SPECIFIED) meter device kit Use to test blood sugar 2 times daily or as directed.  Qty: 1 kit, Refills: 0      insulin glargine (LANTUS PEN) 100 UNIT/ML pen Inject 18 Units subcutaneously at bedtime.  Qty: 15 mL, Refills: 0    Comments: If Lantus is not covered by insurance, may substitute Basaglar or Semglee or other insulin glargine product per insurance preference at same dose and frequency. Future refills by PCP .  Associated Diagnoses: Diabetes mellitus, new onset (H)      lisinopril (ZESTRIL) 10 MG tablet Take 1 tablet (10 mg) by mouth daily.  Qty: 30 tablet, Refills: 0    Comments: Future refills by PCP Dr. Physician No Ref-Primary with phone number None.  Associated Diagnoses: Hypertension, unspecified type      metFORMIN (GLUCOPHAGE) 500 MG tablet Take 1 tablet (500 mg) by mouth 2 times daily (with meals).  Qty: 60 tablet, Refills: 0    Comments: Future refills by PCP Dr. Physician No Ref-Primary with phone number None.  Associated Diagnoses: Diabetes mellitus, new onset (H)      thin (NO BRAND SPECIFIED) lancets Use with lanceting device.  Qty: 50 each, Refills: 6    Comments: To accompany: per insurance.  Associated Diagnoses: Diabetes mellitus, new onset (H)       !! - Potential duplicate medications found. Please discuss with provider.             Allergies:       No Known Allergies        Consultations This Hospital Stay:      Consultation during this admission received from neurology        Condition and Physical on Discharge:        Discharge condition: Stable   Vitals: Blood pressure 136/86, pulse 83, temperature 98.4  F (36.9  C), temperature source Oral, resp. rate 16, height 1.829 m (6'), weight 85.6 kg (188 lb 11.4 oz), SpO2 96%.     Constitutional: Alert, awake and oriented X 3; resting comfortably in no apparent distress         Oral cavity: Moist mucosa   Cardiovascular: Normal s1 s2, regular rate and rhythm, no murmur   Lungs: B/l clear to auscultation, no wheezes or crepitations   Abdomen: Soft, nt, nd, no guarding, rigidity or rebound; BS +   LE : No edema   Musculoskeletal/Neuro Power 5/5 in all extremities; No focal neurological deficits noted   Psychiatry: normal mood and affect             Discharge Time:      Greater than 30 minutes.        Image Results From This Hospital Stay (For Non-EPIC Providers):        Results for orders placed or performed during the hospital encounter of 12/21/24   MR Brain w/o & w Contrast    Narrative    EXAM: MR BRAIN W/O and W CONTRAST, MRA NECK (CAROTIDS) W/O and W CONTRAST, MRA BRAIN ([place] [facility] [place]) W/O CONTRAST  LOCATION: [facility]  DATE: 12/21/2024    INDICATION: dizziness, numbness, feeling off  COMPARISON: None.  CONTRAST: 10mL Gadavist  TECHNIQUE:   1) Routine multiplanar multisequence head MRI without and with intravenous contrast.  2) 3D time-of-flight head MRA without intravenous contrast.  3) Neck MRA without and with IV contrast. Stenosis measurements made according to NASCET criteria unless otherwise specified.    FINDINGS:  MRI BRAIN:  No abnormal restricted diffusion to suggest acute infarct. Brain signal morphology are normal. The ventricles and sulci are normal for age. No evidence of acute intracranial hemorrhage, mass effect, or extra-axial collection.  No abnormal brain   parenchymal or leptomeningeal enhancement. No cerebellar tonsillar ectopia.    Expected signal voids within the distal vertebral, basilar, and bilateral internal carotid arteries.    The globes are unremarkable. The partially imaged paranasal sinuses, mastoid air cells and middle ear cavities are unremarkable. The visualized skull base and calvarium are unremarkable.    MRA HEAD:    Examination of the anterior circulation demonstrates flow within the bilateral internal carotid and proximal aspects of the anterior middle cerebral arteries. The anterior communicating artery is demonstrated.    Examination of the posterior circulation demonstrates flow within the distal vertebral, basilar, proximal aspects of the posterior cerebral arteries. The right and left posterior communicating arteries are not demonstrated with certainty.    No evidence of pathologic vascular occlusion or saccular aneurysm within the visualized intrarenal circulation by MR angiography.    MRA NECK:  The aortic arch has normal branching configuration. There is flow within the brachiocephalic, common carotid, proximal aspects of the subclavian arteries.    The right common carotid artery is patent. Examination of the right carotid bulb demonstrates no evidence of hemodynamically significant stenosis within the right internal carotid artery within the neck.    The left common carotid artery is patent. Examination of the left carotid bulb demonstrates no evidence of hemodynamically significant stenosis within the left internal carotid artery within the neck.    The right vertebral artery is patent. 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      Impression    IMPRESSION:  MRI BRAIN:  1.  No evidence of acute intracranial hemorrhage, mass effect, or infarction.    MRA HEAD:  1.  No evidence of pathologic vascular  occlusion or saccular aneurysm within the visualized intracranial circulation by MR angiography.    MRA NECK:  1.  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  2.  No evidence of hemodynamically significant stenosis within either internal carotid artery within the neck.   MRA Angiogram Head w/o Contrast    Narrative    EXAM: MR BRAIN W/O and W CONTRAST, MRA NECK (CAROTIDS) W/O and W CONTRAST, MRA BRAIN ([place] [facility] [place]) W/O CONTRAST  LOCATION: [facility]  DATE: 12/21/2024    INDICATION: dizziness, numbness, feeling off  COMPARISON: None.  CONTRAST: 10mL Gadavist  TECHNIQUE:   1) Routine multiplanar multisequence head MRI without and with intravenous contrast.  2) 3D time-of-flight head MRA without intravenous contrast.  3) Neck MRA without and with IV contrast. Stenosis measurements made according to NASCET criteria unless otherwise specified.    FINDINGS:  MRI BRAIN:  No abnormal restricted diffusion to suggest acute infarct. Brain signal morphology are normal. The ventricles and sulci are normal for age. No evidence of acute intracranial hemorrhage, mass effect, or extra-axial collection. No abnormal brain   parenchymal or leptomeningeal enhancement. No cerebellar tonsillar ectopia.    Expected signal voids within the distal vertebral, basilar, and bilateral internal carotid arteries.    The globes are unremarkable. The partially imaged paranasal sinuses, mastoid air cells and middle ear cavities are unremarkable. The visualized skull base and calvarium are unremarkable.    MRA HEAD:    Examination of the anterior circulation demonstrates flow within the bilateral internal carotid and proximal aspects of the anterior middle cerebral arteries. The anterior communicating artery is demonstrated.    Examination of the posterior circulation demonstrates flow within the distal  vertebral, basilar, proximal aspects of the posterior cerebral arteries. The right and left posterior communicating arteries are not demonstrated with certainty.    No evidence of pathologic vascular occlusion or saccular aneurysm within the visualized intrarenal circulation by MR angiography.    MRA NECK:  The aortic arch has normal branching configuration. There is flow within the brachiocephalic, common carotid, proximal aspects of the subclavian arteries.    The right common carotid artery is patent. Examination of the right carotid bulb demonstrates no evidence of hemodynamically significant stenosis within the right internal carotid artery within the neck.    The left common carotid artery is patent. Examination of the left carotid bulb demonstrates no evidence of hemodynamically significant stenosis within the left internal carotid artery within the neck.    The right vertebral artery is patent. 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      Impression    IMPRESSION:  MRI BRAIN:  1.  No evidence of acute intracranial hemorrhage, mass effect, or infarction.    MRA HEAD:  1.  No evidence of pathologic vascular occlusion or saccular aneurysm within the visualized intracranial circulation by MR angiography.    MRA NECK:  1.  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  2.  No evidence of hemodynamically significant stenosis within either internal carotid artery within the neck.   MRA Angiogram Neck w/o & w Contrast    Narrative    EXAM: MR BRAIN W/O and W CONTRAST, MRA NECK (CAROTIDS) W/O and W CONTRAST, MRA BRAIN ([place] [facility] [place]) W/O CONTRAST  LOCATION: [facility]  DATE: 12/21/2024    INDICATION: dizziness, numbness, feeling off  COMPARISON: None.  CONTRAST:  10mL Gadavist  TECHNIQUE:   1) Routine multiplanar multisequence head MRI without and with intravenous contrast.  2) 3D time-of-flight head MRA without intravenous contrast.  3) Neck MRA without and with IV contrast. Stenosis measurements made according to NASCET criteria unless otherwise specified.    FINDINGS:  MRI BRAIN:  No abnormal restricted diffusion to suggest acute infarct. Brain signal morphology are normal. The ventricles and sulci are normal for age. No evidence of acute intracranial hemorrhage, mass effect, or extra-axial collection. No abnormal brain   parenchymal or leptomeningeal enhancement. No cerebellar tonsillar ectopia.    Expected signal voids within the distal vertebral, basilar, and bilateral internal carotid arteries.    The globes are unremarkable. The partially imaged paranasal sinuses, mastoid air cells and middle ear cavities are unremarkable. The visualized skull base and calvarium are unremarkable.    MRA HEAD:    Examination of the anterior circulation demonstrates flow within the bilateral internal carotid and proximal aspects of the anterior middle cerebral arteries. The anterior communicating artery is demonstrated.    Examination of the posterior circulation demonstrates flow within the distal vertebral, basilar, proximal aspects of the posterior cerebral arteries. The right and left posterior communicating arteries are not demonstrated with certainty.    No evidence of pathologic vascular occlusion or saccular aneurysm within the visualized intrarenal circulation by MR angiography.    MRA NECK:  The aortic arch has normal branching configuration. There is flow within the brachiocephalic, common carotid, proximal aspects of the subclavian arteries.    The right common carotid artery is patent. Examination of the right carotid bulb demonstrates no evidence of hemodynamically significant stenosis within the right internal carotid artery within the neck.    The left common carotid  artery is patent. Examination of the left carotid bulb demonstrates no evidence of hemodynamically significant stenosis within the left internal carotid artery within the neck.    The right vertebral artery is patent. 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      Impression    IMPRESSION:  MRI BRAIN:  1.  No evidence of acute intracranial hemorrhage, mass effect, or infarction.    MRA HEAD:  1.  No evidence of pathologic vascular occlusion or saccular aneurysm within the visualized intracranial circulation by MR angiography.    MRA NECK:  1.  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  2.  No evidence of hemodynamically significant stenosis within either internal carotid artery within the neck.   Echocardiogram Complete     Value    LVEF  55-60%    Narrative    [id number]  [id number]  [id number]^[email or web address]^LUCINDA^STEPHON     [facility]  Echocardiography Laboratory  [address] [place] [address]     Name: STEPHON BURRIS  MRN: [id number]  : 1960  Study Date: 2024 11:34 AM  Age: 64 yrs  Gender: Male  Patient Location: [facility]  Reason For Study: TIA  Ordering Physician: LUCINDA PASTOR  Referring Physician: LUCINDA PATSOR  Performed By: Juan Green     BSA: 2.1 m2  Height: 72 in  Weight: 188 lb  HR: 72  BP: 157/97 mmHg  ______________________________________________________________________________  Procedure  Echocardiogram with two-dimensional, color and spectral Doppler. Definity (NDC  #30127-420) given intravenously.  ______________________________________________________________________________  Interpretation Summary     A cardiac source of embolus was not identified.  Sinus rhythm was noted.  There is no color Doppler evidence of an atrial  shunt.  Left ventricular systolic function is normal.  The visual ejection fraction is 55-60%.  The left ventricle is normal in size.  There is trace mitral regurgitation.  Aortic root dilatation is present.(38mm)     No old stuedies for comparison  ______________________________________________________________________________  Left Ventricle  The left ventricle is normal in size. There is normal left ventricular wall  thickness. Left ventricular systolic function is normal. The visual ejection  fraction is 55-60%. Grade I or early diastolic dysfunction. No regional wall  motion abnormalities noted. There is no thrombus seen in the left ventricle.     Right Ventricle  The right ventricle is normal in structure, function and size. There is no  mass or thrombus in the right ventricle.     Atria  Normal left atrial size. Right atrial size is normal. There is no color  Doppler evidence of an atrial shunt. The left atrial appendage is not well  visualized.     Mitral Valve  The mitral valve leaflets appear normal. There is no evidence of stenosis,  fluttering, or prolapse. There is trace mitral regurgitation. There is no  mitral valve stenosis.     Tricuspid Valve  Normal tricuspid valve. No tricuspid regurgitation. Right ventricular systolic  pressure could not be approximated due to inadequate tricuspid regurgitation.  There is no tricuspid stenosis.     Aortic Valve  The aortic valve is trileaflet. No aortic regurgitation is present. No aortic  stenosis is present.     Pulmonic Valve  Normal pulmonic valve. There is no pulmonic valvular regurgitation. There is  no pulmonic valvular stenosis.     Vessels  Aortic root dilatation is present. Normal size ascending aorta. The inferior  vena cava is normal. The pulmonary artery is normal size.     Pericardium  The pericardium appears normal. There is no pleural effusion.     Rhythm  Sinus rhythm was  "noted.  ______________________________________________________________________________  MMode/2D Measurements & Calculations  IVSd: 1.2 cm     LVIDd: 4.4 cm  LVIDs: 3.2 cm  LVPWd: 1.2 cm  FS: 28.1 %  LV mass(C)d: 191.3 grams  LV mass(C)dI: 92.2 grams/m2  Ao root diam: 3.8 cm  asc Aorta Diam: 3.6 cm  LVOT diam: 2.3 cm  LVOT area: 4.0 cm2  Ao root diam index Ht(cm/m): 2.1  Ao root diam index BSA (cm/m2): 1.8  Asc Ao diam index BSA (cm/m2): 1.7  Asc Ao diam index Ht(cm/m): 2.0  LA Volume (BP): 39.5 ml     LA Volume Index (BP): 19.0 ml/m2  RWT: 0.55  TAPSE: 2.4 cm     Doppler Measurements & Calculations  MV E max durga: 63.0 cm/sec  MV A max durga: 78.2 cm/sec  MV E/A: 0.81  MV dec slope: 373.7 cm/sec2  MV dec time: 0.17 sec  PA acc time: 0.14 sec  E/E' av.5  Lateral E/e': 6.6  Medial E/e': 12.3  RV S Durga: 12.1 cm/sec     ______________________________________________________________________________  Report approved by: Dr. Leonel Rhoades on 2024 01:30 PM                 Most Recent Lab Results In EPIC (For Non-EPIC Providers):    Most Recent 3 CBC's:  Recent Labs   Lab Test 24  1556   WBC 8.3   HGB 16.8   MCV 86         Most Recent 3 BMP's:  Recent Labs   Lab Test 24  0550 24  0047 24  2147 24  2346 24  1556   NA  --   --   --   --  131*   POTASSIUM  --   --   --   --  4.7   CHLORIDE  --   --   --   --  91*   CO2  --   --   --   --  26   BUN  --   --   --   --  19.8   CR  --   --   --   --  1.01   ANIONGAP  --   --   --   --  14   MARIAM  --   --   --   --  9.0   * 266* 272*   < > 472*    < > = values in this interval not displayed.     Most Recent 3 Troponin's:No lab results found.    Invalid input(s): \"TROP\", \"TROPONINIES\"  Most Recent 3 INR's:No lab results found.  Most Recent 2 LFT's:  Recent Labs   Lab Test 24  1556   AST 27   ALT 30   ALKPHOS 87   BILITOTAL 0.3     Most Recent Cholesterol Panel:  Recent Labs   Lab Test 24  1556   CHOL 210*   LDL 94 "   HDL 34*   TRIG 761*     Most Recent 6 Bacteria Isolates From Any Culture (See EPIC Reports for Culture Details):  Recent Labs   Lab Test 01/05/19  1117   CULT No beta hemolytic Streptococcus Group A isolated     Most Recent TSH, T4 and HgbA1c:   Recent Labs   Lab Test 12/21/24  1556   A1C 11.4*

## 2024-12-23 NOTE — PLAN OF CARE
Goal Outcome Evaluation:       Pt here to rule out CVA, L vertebral artery occlusion, elevated blood glucose . A&O x4. Neuros intact. VSS. Tele NSR. Mod carb diet, thin liquids. Takes pills whole with water. BG ACHS. Up independently. Denies pain. Pt scoring green on the Aggression Stop Light Tool. Plan to discharge tomorrow most likely, neurology signed off, all tests negative for CVA. Will need blood glucose machine, teaching, and supplies for home prior to discharge.

## 2025-01-11 ENCOUNTER — HEALTH MAINTENANCE LETTER (OUTPATIENT)
Age: 65
End: 2025-01-11

## 2025-03-08 ENCOUNTER — OFFICE VISIT (OUTPATIENT)
Dept: URGENT CARE | Facility: URGENT CARE | Age: 65
End: 2025-03-08
Payer: COMMERCIAL

## 2025-03-08 VITALS
OXYGEN SATURATION: 97 % | SYSTOLIC BLOOD PRESSURE: 102 MMHG | HEART RATE: 99 BPM | TEMPERATURE: 100 F | RESPIRATION RATE: 18 BRPM | WEIGHT: 188 LBS | BODY MASS INDEX: 25.5 KG/M2 | DIASTOLIC BLOOD PRESSURE: 73 MMHG

## 2025-03-08 DIAGNOSIS — J10.1 INFLUENZA A: ICD-10-CM

## 2025-03-08 DIAGNOSIS — R05.9 COUGH, UNSPECIFIED TYPE: Primary | ICD-10-CM

## 2025-03-08 DIAGNOSIS — R50.9 FEVER, UNSPECIFIED FEVER CAUSE: ICD-10-CM

## 2025-03-08 LAB
FLUAV AG SPEC QL IA: POSITIVE
FLUBV AG SPEC QL IA: NEGATIVE
SARS-COV-2 RNA RESP QL NAA+PROBE: NEGATIVE

## 2025-03-08 PROCEDURE — 3078F DIAST BP <80 MM HG: CPT | Performed by: PHYSICIAN ASSISTANT

## 2025-03-08 PROCEDURE — 3074F SYST BP LT 130 MM HG: CPT | Performed by: PHYSICIAN ASSISTANT

## 2025-03-08 PROCEDURE — 87635 SARS-COV-2 COVID-19 AMP PRB: CPT | Performed by: PHYSICIAN ASSISTANT

## 2025-03-08 PROCEDURE — 87804 INFLUENZA ASSAY W/OPTIC: CPT | Performed by: PHYSICIAN ASSISTANT

## 2025-03-08 PROCEDURE — 99203 OFFICE O/P NEW LOW 30 MIN: CPT | Performed by: PHYSICIAN ASSISTANT

## 2025-03-08 RX ORDER — OSELTAMIVIR PHOSPHATE 75 MG/1
75 CAPSULE ORAL 2 TIMES DAILY
Qty: 10 CAPSULE | Refills: 0 | Status: SHIPPED | OUTPATIENT
Start: 2025-03-08 | End: 2025-03-13

## 2025-03-08 RX ORDER — BENZONATATE 200 MG/1
200 CAPSULE ORAL 3 TIMES DAILY PRN
Qty: 30 CAPSULE | Refills: 0 | Status: SHIPPED | OUTPATIENT
Start: 2025-03-08

## 2025-03-08 ASSESSMENT — ENCOUNTER SYMPTOMS
LIGHT-HEADEDNESS: 1
DIZZINESS: 1
FATIGUE: 1
FEVER: 1
COUGH: 1

## 2025-03-08 NOTE — LETTER
3/8/2025    Roger Serrano   1960        To Whom it May Concern;    Please excuse Roger Serrano from work/school for a healthcare visit on Mar 8, 2025.  Patient may return to work on 3/17.      Sincerely,        KOBE Wills

## 2025-03-08 NOTE — PROGRESS NOTES
"SUBJECTIVE:   Roger Serrano is a 64 year old male presenting with a chief complaint of   Chief Complaint   Patient presents with    Cough     \"Dry\" 3 days    Dizziness     \"Passing out\"? For 4 times this morning    Fatigue       He is an established patient of [facility].  Patient with history of vertebral artery occlusion and DM (last A1c was 6.8 - 5 days ago) presenting with the above.    Throat lozenges;    Review of Systems   Constitutional:  Positive for fatigue and fever.   Respiratory:  Positive for cough.    Neurological:  Positive for dizziness and light-headedness.   All other systems reviewed and are negative.      Past Medical History:   Diagnosis Date    Diabetes mellitus, new onset (H) 12/21/2024    Hypertension, unspecified type 12/21/2024     Family History   Problem Relation Age of Onset    Other Cancer Mother     Thyroid Disease Mother     Diabetes Maternal Grandmother      Current Outpatient Medications   Medication Sig Dispense Refill    benzonatate (TESSALON) 200 MG capsule Take 1 capsule (200 mg) by mouth 3 times daily as needed. 30 capsule 0    blood glucose (NO BRAND SPECIFIED) test strip Use to test blood sugar 4 times daily or as directed. 100 strip 6    blood glucose monitoring (NO BRAND SPECIFIED) meter device kit Use to test blood sugar 4 times daily or as directed. 1 kit 0    blood glucose monitoring (NO BRAND SPECIFIED) meter device kit Use to test blood sugar 2 times daily or as directed. 1 kit 0    insulin glargine (LANTUS PEN) 100 UNIT/ML pen Inject 18 Units subcutaneously at bedtime. 15 mL 0    lisinopril (ZESTRIL) 10 MG tablet Take 1 tablet (10 mg) by mouth daily. 30 tablet 0    oseltamivir (TAMIFLU) 75 MG capsule Take 1 capsule (75 mg) by mouth 2 times daily for 5 days. 10 capsule 0    thin (NO BRAND SPECIFIED) lancets Use with lanceting device. 50 each 6    atorvastatin (LIPITOR) 10 MG tablet Take 1 tablet (10 mg) by mouth every evening. 30 tablet 0    metFORMIN (GLUCOPHAGE) 500 " MG tablet Take 1 tablet (500 mg) by mouth 2 times daily (with meals). 60 tablet 0     Social History     Tobacco Use    Smoking status: Never    Smokeless tobacco: Never   Substance Use Topics    Alcohol use: No       OBJECTIVE  /73   Pulse 99   Temp 100  F (37.8  C)   Resp 18   Wt 85.3 kg (188 lb)   SpO2 97%   BMI 25.50 kg/m      Physical Exam  Vitals and nursing note reviewed.   Constitutional:       Appearance: Normal appearance. He is normal weight.   HENT:      Head: Normocephalic and atraumatic.      Right Ear: Tympanic membrane, ear canal and external ear normal.      Left Ear: Tympanic membrane, ear canal and external ear normal.      Nose: Nose normal.      Mouth/Throat:      Mouth: Mucous membranes are moist.      Pharynx: Oropharynx is clear.   Eyes:      Extraocular Movements: Extraocular movements intact.      Conjunctiva/sclera: Conjunctivae normal.   Cardiovascular:      Rate and Rhythm: Normal rate and regular rhythm.      Pulses: Normal pulses.      Heart sounds: Normal heart sounds.   Pulmonary:      Effort: Pulmonary effort is normal.      Breath sounds: Normal breath sounds.   Musculoskeletal:      Cervical back: Normal range of motion and neck supple. No rigidity. No muscular tenderness.   Skin:     General: Skin is warm and dry.   Neurological:      General: No focal deficit present.      Mental Status: He is alert.   Psychiatric:         Mood and Affect: Mood normal.         Behavior: Behavior normal.         Labs:  Results for orders placed or performed in visit on 03/08/25 (from the past 24 hours)   Influenza A & B Antigen - Clinic Collect    Specimen: Nasopharyngeal; Swab   Result Value Ref Range    Influenza A antigen Positive (A) Negative    Influenza B antigen Negative Negative    Narrative    Test results must be correlated with clinical data. If necessary, results should be confirmed by a molecular assay or viral culture.       ASSESSMENT:      ICD-10-CM    1. Cough,  unspecified type  R05.9 Influenza A & B Antigen - Clinic Collect     COVID-19 Virus (Coronavirus) by PCR Nose      2. Fever, unspecified fever cause  R50.9 Influenza A & B Antigen - Clinic Collect     COVID-19 Virus (Coronavirus) by PCR Nose      3. Influenza A  J10.1 benzonatate (TESSALON) 200 MG capsule     oseltamivir (TAMIFLU) 75 MG capsule           Medical Decision Making:    Differential Diagnosis:  URI Adult/Peds:  Influenza, Viral syndrome, and covid      Serious Comorbid Conditions:  Adult:   reviewed    PLAN:  Rx  for tessalon perles and tamiflu.  Discussed SE.  Note for work..  Discussed likely course.  Discussed reasons to seek immediate medical attention.  Additionally if no improvement or worsening in one week, may follow up with PCP and/or UC.      Discussed and recommended CDC guidelines for self isolation.  COVID test pending.        Followup:    If not improving or if condition worsens, follow up with your Primary Care Provider, If not improving or if conditions worsens over the next 12-24 hours, go to the Emergency Department    There are no Patient Instructions on file for this visit.

## 2025-03-10 ENCOUNTER — TELEPHONE (OUTPATIENT)
Dept: URGENT CARE | Facility: URGENT CARE | Age: 65
End: 2025-03-10
Payer: COMMERCIAL

## 2025-03-10 NOTE — TELEPHONE ENCOUNTER
Patient called central scheduling and call was transferred to me.  Roger said he was seen here saturday and dx with positve influenza. Patient feeling like he is going to pass out and very fatigued. I read provider Marlin Chavarria note from Saturday and directed patient to go to the emergency department.   Aleida FRIED MA

## 2025-04-05 ENCOUNTER — HEALTH MAINTENANCE LETTER (OUTPATIENT)
Age: 65
End: 2025-04-05

## 2025-07-19 ENCOUNTER — HEALTH MAINTENANCE LETTER (OUTPATIENT)
Age: 65
End: 2025-07-19